# Patient Record
Sex: FEMALE | Race: BLACK OR AFRICAN AMERICAN | NOT HISPANIC OR LATINO | Employment: UNEMPLOYED | URBAN - METROPOLITAN AREA
[De-identification: names, ages, dates, MRNs, and addresses within clinical notes are randomized per-mention and may not be internally consistent; named-entity substitution may affect disease eponyms.]

---

## 2017-12-29 ENCOUNTER — OFFICE VISIT (OUTPATIENT)
Dept: INTERNAL MEDICINE | Facility: CLINIC | Age: 22
End: 2017-12-29
Payer: COMMERCIAL

## 2017-12-29 VITALS
WEIGHT: 192.38 LBS | HEIGHT: 64 IN | TEMPERATURE: 99 F | SYSTOLIC BLOOD PRESSURE: 130 MMHG | DIASTOLIC BLOOD PRESSURE: 86 MMHG | HEART RATE: 75 BPM | RESPIRATION RATE: 18 BRPM | BODY MASS INDEX: 32.84 KG/M2

## 2017-12-29 DIAGNOSIS — J45.30 MILD PERSISTENT ASTHMA WITHOUT COMPLICATION: ICD-10-CM

## 2017-12-29 DIAGNOSIS — Z72.51 UNPROTECTED SEXUAL INTERCOURSE: ICD-10-CM

## 2017-12-29 DIAGNOSIS — N89.8 VAGINAL DISCHARGE: Primary | ICD-10-CM

## 2017-12-29 DIAGNOSIS — L73.9 FOLLICULITIS: ICD-10-CM

## 2017-12-29 DIAGNOSIS — L30.8 PRURITIC DERMATITIS: ICD-10-CM

## 2017-12-29 LAB
B-HCG UR QL: NEGATIVE
CTP QC/QA: YES

## 2017-12-29 PROCEDURE — 99204 OFFICE O/P NEW MOD 45 MIN: CPT | Mod: S$GLB,,, | Performed by: FAMILY MEDICINE

## 2017-12-29 PROCEDURE — 87480 CANDIDA DNA DIR PROBE: CPT

## 2017-12-29 PROCEDURE — 87491 CHLMYD TRACH DNA AMP PROBE: CPT

## 2017-12-29 PROCEDURE — 99999 PR PBB SHADOW E&M-NEW PATIENT-LVL III: CPT | Mod: PBBFAC,,, | Performed by: FAMILY MEDICINE

## 2017-12-29 PROCEDURE — 81025 URINE PREGNANCY TEST: CPT | Mod: S$GLB,,, | Performed by: FAMILY MEDICINE

## 2017-12-29 RX ORDER — ALBUTEROL SULFATE 90 UG/1
2 AEROSOL, METERED RESPIRATORY (INHALATION) EVERY 6 HOURS PRN
Qty: 18 G | Refills: 0 | Status: SHIPPED | OUTPATIENT
Start: 2017-12-29 | End: 2018-04-16 | Stop reason: SDUPTHER

## 2017-12-29 RX ORDER — MUPIROCIN 20 MG/G
OINTMENT TOPICAL 2 TIMES DAILY
Qty: 1 TUBE | Refills: 0 | Status: SHIPPED | OUTPATIENT
Start: 2017-12-29 | End: 2018-01-08

## 2017-12-29 RX ORDER — HYDROXYZINE HYDROCHLORIDE 25 MG/1
25 TABLET, FILM COATED ORAL 3 TIMES DAILY PRN
Qty: 30 TABLET | Refills: 0 | Status: SHIPPED | OUTPATIENT
Start: 2017-12-29 | End: 2018-03-16

## 2017-12-29 RX ORDER — DEXTROAMPHETAMINE SACCHARATE, AMPHETAMINE ASPARTATE MONOHYDRATE, DEXTROAMPHETAMINE SULFATE AND AMPHETAMINE SULFATE 5; 5; 5; 5 MG/1; MG/1; MG/1; MG/1
20 CAPSULE, EXTENDED RELEASE ORAL 2 TIMES DAILY
Refills: 0 | COMMUNITY
Start: 2017-12-21 | End: 2018-03-16 | Stop reason: SDUPTHER

## 2017-12-29 RX ORDER — CLINDAMYCIN HYDROCHLORIDE 300 MG/1
300 CAPSULE ORAL 2 TIMES DAILY
Qty: 14 CAPSULE | Refills: 0 | Status: SHIPPED | OUTPATIENT
Start: 2017-12-29 | End: 2018-01-05

## 2017-12-29 RX ORDER — FLUCONAZOLE 150 MG/1
TABLET ORAL
Qty: 2 TABLET | Refills: 0 | Status: SHIPPED | OUTPATIENT
Start: 2017-12-29 | End: 2018-03-16 | Stop reason: ALTCHOICE

## 2017-12-29 RX ORDER — ALBUTEROL SULFATE 90 UG/1
2 AEROSOL, METERED RESPIRATORY (INHALATION) EVERY 6 HOURS PRN
COMMUNITY
End: 2017-12-29 | Stop reason: SDUPTHER

## 2017-12-29 RX ORDER — FLUCONAZOLE 150 MG/1
TABLET ORAL
Refills: 0 | COMMUNITY
Start: 2017-11-03 | End: 2017-12-29 | Stop reason: ALTCHOICE

## 2017-12-29 RX ORDER — SERTRALINE HYDROCHLORIDE 50 MG/1
50 TABLET, FILM COATED ORAL DAILY
Refills: 0 | COMMUNITY
Start: 2017-12-22 | End: 2018-03-16 | Stop reason: SDUPTHER

## 2017-12-29 RX ORDER — METRONIDAZOLE 500 MG/1
TABLET ORAL
Refills: 5 | COMMUNITY
Start: 2017-10-19 | End: 2017-12-29 | Stop reason: ALTCHOICE

## 2017-12-29 NOTE — PATIENT INSTRUCTIONS
Vaginal Infection: Bacterial Vaginosis  Both good and bad bacteria are present in a healthy vagina. Bacterial vaginosis (BV) occurs when these bacteria get out of balance. The numbers of good bacteria decrease. This allows the numbers of bad bacteria to increase and cause BV. In most cases, BV is not a serious problem.  Causes of bacterial vaginosis  The cause of BV is not clear. Douching may lead to it. Having sex with a new partner or more than 1 partner makes it more likely.  Symptoms of bacterial vaginosis  Symptoms of BV vary for each woman. Some women have few symptoms or none at all. If symptoms are present, they can include:  · Thin, milky white or gray or sometimes green discharge  · Unpleasant fishy odor  · Irritation, itching, and burning at opening of vagina which may indicate mixed vaginitis    · Burning or irritation with sex or urination which may indicate mixed vaginitis  Diagnosing bacterial vaginosis  Your healthcare provider will ask about your symptoms and health history. He or she will also do a pelvic exam. This is an exam of your vagina and cervix. A sample of vaginal fluid or discharge may be taken. This sample is checked for signs of BV.  Treating bacterial vaginosis  BV is often treated with antibiotics. They may be given in oral pill form or as a vaginal cream. To use these medicines:  · Be sure to take all of your medicine, even if your symptoms go away.  · If youre taking antibiotic pills, do not drink alcohol until youre finished with all of your medicine.  · If youre using vaginal cream, apply it as directed. Be aware that the cream may make condoms and diaphragms less effective.  · Call your healthcare provider if symptoms do not go away within 4 days of starting treatment. Also call if you have a reaction to the medicine.  Why treatment matters  Even if you have no symptoms or your symptoms are mild, BV should be treated. Untreated BV can lead to health problems such  as:  · Increased risk of  delivery if youre pregnant  · Increased risk of complications after surgery on the reproductive organs  · Possible increased risk of pelvic inflammatory disease (PID)   Date Last Reviewed: 3/1/2017  © 7030-4142 [employer]. [address] [place] [address]. All rights reserved. This information is not intended as a substitute for professional medical care. Always follow your healthcare professional's instructions.        Vaginal Infection: Understanding the Vaginal Environment  The vagina is a canal. It connects the uterus (womb) to the outside of the body. It is home to many types of bacteria and other tiny organisms. These different bacteria most often stay balanced in number. This keeps the vagina healthy. If the balance changes, it can cause infection.   A healthy environment  Many types of bacteria are present in a healthy vagina. When balanced, they dont cause problems. Small amounts of yeast may also be present without causing problems. The most common type of bacteria in the vagina is lactobacillus. It helps keep the vagina at a low pH. A low pH keeps bad bacteria from taking over.  Normal vaginal discharge  The vagina makes fluid. It is sent out as discharge. This also keeps the vagina healthy. Normal discharge can be clear, white, or yellowish. Most women find that normal discharge varies in amount and color through the month.  An unhealthy environment  The vaginal environment may get out of balance. This may result in a vaginal infection. There are a few reasons this can happen. The pH may have changed. The amount of one organism, such as yeast, may increase. Or an outside organism may get into the vagina and throw off the balance:  · Bacterial vaginosis (BV). BV is due to an imbalance in the normal bacteria in the vagina. Lactobacillus bacteria decrease. As a result, the numbers of bad bacteria increase.  · Candidiasis (yeast infection). Yeast is  a type of fungus. A yeast infection occurs when yeast cells in the vagina increase. They then attack vaginal tissues. A type of yeast called Candida albicans is often involved.  · Trichomoniasis (trich). Trich is a parasite. It is passed from one person to another during sex. Men with trich often dont have any symptoms. In women, it can take weeks or months before symptoms appear.  Date Last Reviewed: 3/1/2017  © 0250-8496 "[employer]. [address] [place] [address]. All rights reserved. This information is not intended as a substitute for professional medical care. Always follow your healthcare professional's instructions.        My Asthma Symptom Diary  Keep track of symptoms with the chart below. (Make some copies first.) Show your records to your healthcare provider at your visits. As your asthma control improves you should have fewer episodes of symptoms to record.     Date Symptoms Possible triggers Action taken Results Did symptoms interfere with your sleep? Yes or No?      3/3 Example:  Wheezing, peak flow 75%    Cold air    2 puffs albuterol, went inside    Symptoms gone in 20 min. No                                                                                                                           Date Last Reviewed: 10/1/2016  © 1155-9438 "[employer]. [address] [place] [id number]. All rights reserved. This information is not intended as a substitute for professional medical care. Always follow your healthcare professional's instructions.

## 2017-12-29 NOTE — PROGRESS NOTES
"Subjective:       Patient ID: Zak Wheeler is a 22 y.o. female.    Chief Complaint: heavy discharge    She is a new pt to me here with c/o having creamy odorous vaginal D/C for couple months. Seen by gyn end of October and rxd metronidazole followed by thick itchy DC and given diflucan.  Her DC came back immediately - "smelly discharge". At risk for STIs/pregnancy due to not using condoms with long term partner.    She had an early menses 3 weeks ago - about 2 weeks early. 12/11/17.       Vaginal Discharge   The patient's primary symptoms include a genital odor and vaginal discharge. The patient's pertinent negatives include no genital itching. Associated symptoms include frequency. Pertinent negatives include no dysuria or urgency. Hematuria: there is a little pink discoloration recently on tissue.     Review of Systems   Genitourinary: Positive for frequency and vaginal discharge. Negative for dysuria and urgency. Hematuria: there is a little pink discoloration recently on tissue.       Objective:      Physical Exam   Constitutional: She is oriented to person, place, and time. She appears well-developed and well-nourished.   HENT:   Head: Normocephalic and atraumatic.   Right Ear: External ear normal.   Left Ear: External ear normal.   Mouth/Throat: Oropharynx is clear and moist. No oropharyngeal exudate.   Neck: Normal range of motion. Neck supple.   Cardiovascular: Normal rate, regular rhythm and normal heart sounds.    Pulmonary/Chest: Effort normal and breath sounds normal.   Abdominal: Soft. Bowel sounds are normal. She exhibits no distension. There is no tenderness.   Genitourinary: Uterus normal. Vaginal discharge (large amount homogeneous D/C, some foamy secretions.) found.   Genitourinary Comments: No CMT. No erythema or lesions to cervix.  No adnexal TTP B and uterus NTTP.   Neurological: She is alert and oriented to person, place, and time.   Skin: Skin is warm and dry. Rash (to her left lower " quadrant there are several erythematous papules associated with hair follicles) noted.   Psychiatric: She has a normal mood and affect. Her behavior is normal.         Assessment/Plan:     1. Vaginal discharge  Vaginosis Screen by DNA Probe    C. trachomatis/N. gonorrhoeae by AMP DNA Urine    clindamycin (CLEOCIN) 300 MG capsule    fluconazole (DIFLUCAN) 150 MG Tab   2. Mild persistent asthma without complication  albuterol 90 mcg/actuation inhaler   3. Unprotected sexual intercourse  POCT urine pregnancy   4. Folliculitis  mupirocin (BACTROBAN) 2 % ointment   5. Pruritic dermatitis  hydrOXYzine HCl (ATARAX) 25 MG tablet    she appears to be using her rescue inhaler at a level requiring maintenance meds.  She will note down the circumstances of each use of her inhaler and bring them to the next visit in 4 weeks.  We'll treat empirically for BV with an alternate medication as well as recommend using Diflucan now and again at the end of her antibiotic course.  She can use mupirocin to the follicular lesions.

## 2017-12-30 LAB
C TRACH DNA SPEC QL NAA+PROBE: DETECTED
CANDIDA RRNA VAG QL PROBE: NEGATIVE
G VAGINALIS RRNA GENITAL QL PROBE: POSITIVE
N GONORRHOEA DNA SPEC QL NAA+PROBE: NOT DETECTED
T VAGINALIS RRNA GENITAL QL PROBE: NEGATIVE

## 2018-01-03 ENCOUNTER — TELEPHONE (OUTPATIENT)
Dept: INTERNAL MEDICINE | Facility: CLINIC | Age: 23
End: 2018-01-03

## 2018-01-03 DIAGNOSIS — A74.9 CHLAMYDIA INFECTION: Primary | ICD-10-CM

## 2018-01-03 RX ORDER — AZITHROMYCIN 250 MG/1
1000 TABLET, FILM COATED ORAL ONCE
Qty: 8 TABLET | Refills: 0 | Status: SHIPPED | OUTPATIENT
Start: 2018-01-03 | End: 2018-01-03

## 2018-01-04 NOTE — TELEPHONE ENCOUNTER
I gave the patient her results and recommendations.  A partner dose was sent to her pharmacy along with her dose.  This was explained to the patient that she will take 4 tablets and partner will take 4 tablets that it is all done as 1 dose.  We will recheck in 3 months to confirm that she remains uninfected.

## 2018-01-08 ENCOUNTER — TELEPHONE (OUTPATIENT)
Dept: INTERNAL MEDICINE | Facility: CLINIC | Age: 23
End: 2018-01-08

## 2018-01-08 DIAGNOSIS — A74.9 CHLAMYDIA INFECTION: Primary | ICD-10-CM

## 2018-01-08 RX ORDER — DOXYCYCLINE 100 MG/1
100 CAPSULE ORAL EVERY 12 HOURS
Qty: 14 CAPSULE | Refills: 0 | Status: SHIPPED | OUTPATIENT
Start: 2018-01-08 | End: 2018-01-15

## 2018-01-08 NOTE — TELEPHONE ENCOUNTER
Recommend she take a doxycycline course.  I will send that to her pharmacy.  Must take for full 7 days a.m. and p.m.

## 2018-01-08 NOTE — TELEPHONE ENCOUNTER
Pt states that she was prescribed azithromycin (4 pills) and she took them and threw up after. She wants to know if she needs to retake or be prescribed something different that she can tolerate.    Please Advise

## 2018-01-08 NOTE — TELEPHONE ENCOUNTER
----- Message from Julieta Mccabe sent at 1/8/2018  2:56 PM CST -----  Contact: Pt.  Returning call to nurse.   Call pt at [phone].

## 2018-01-08 NOTE — TELEPHONE ENCOUNTER
Returned the patient phone call. Advised that another medication was called to her pharmacy and gave information as to how she is to take the medication. Pt verbalized understanding of the information given.

## 2018-03-16 ENCOUNTER — OFFICE VISIT (OUTPATIENT)
Dept: INTERNAL MEDICINE | Facility: CLINIC | Age: 23
End: 2018-03-16
Payer: COMMERCIAL

## 2018-03-16 VITALS
BODY MASS INDEX: 33.47 KG/M2 | TEMPERATURE: 96 F | WEIGHT: 195 LBS | OXYGEN SATURATION: 98 % | HEART RATE: 79 BPM | SYSTOLIC BLOOD PRESSURE: 126 MMHG | DIASTOLIC BLOOD PRESSURE: 47 MMHG

## 2018-03-16 DIAGNOSIS — B96.89 BV (BACTERIAL VAGINOSIS): ICD-10-CM

## 2018-03-16 DIAGNOSIS — A74.9 CHLAMYDIA INFECTION: ICD-10-CM

## 2018-03-16 DIAGNOSIS — R41.840 INATTENTION: ICD-10-CM

## 2018-03-16 DIAGNOSIS — N76.0 BV (BACTERIAL VAGINOSIS): ICD-10-CM

## 2018-03-16 DIAGNOSIS — Z86.19 HISTORY OF SEXUALLY TRANSMITTED DISEASE: ICD-10-CM

## 2018-03-16 DIAGNOSIS — F41.9 ANXIETY: Primary | ICD-10-CM

## 2018-03-16 PROCEDURE — 99999 PR PBB SHADOW E&M-EST. PATIENT-LVL III: CPT | Mod: PBBFAC,,, | Performed by: FAMILY MEDICINE

## 2018-03-16 PROCEDURE — 99214 OFFICE O/P EST MOD 30 MIN: CPT | Mod: S$GLB,,, | Performed by: FAMILY MEDICINE

## 2018-03-16 PROCEDURE — 87491 CHLMYD TRACH DNA AMP PROBE: CPT

## 2018-03-16 RX ORDER — METRONIDAZOLE 500 MG/1
500 TABLET ORAL 2 TIMES DAILY
Qty: 14 TABLET | Refills: 0 | Status: SHIPPED | OUTPATIENT
Start: 2018-03-16 | End: 2018-03-26

## 2018-03-16 RX ORDER — DEXTROAMPHETAMINE SACCHARATE, AMPHETAMINE ASPARTATE MONOHYDRATE, DEXTROAMPHETAMINE SULFATE AND AMPHETAMINE SULFATE 5; 5; 5; 5 MG/1; MG/1; MG/1; MG/1
20 CAPSULE, EXTENDED RELEASE ORAL 2 TIMES DAILY
Qty: 60 CAPSULE | Refills: 0 | Status: SHIPPED | OUTPATIENT
Start: 2018-03-16 | End: 2020-09-01

## 2018-03-16 RX ORDER — SERTRALINE HYDROCHLORIDE 50 MG/1
75 TABLET, FILM COATED ORAL DAILY
Qty: 45 TABLET | Refills: 0 | Status: SHIPPED | OUTPATIENT
Start: 2018-03-16 | End: 2019-02-05 | Stop reason: SDUPTHER

## 2018-03-16 NOTE — PROGRESS NOTES
Subjective:       Patient ID: Zak Wheeler is a 23 y.o. female.    Chief Complaint: Follow-up (STD); Rx refill; and Asthma Questions (congestion)    She is here for a recheck regarding possible reinfection of her Chlamydia after treatment in December.  Has had spotting x 4 days. Lighter and shorter than usual menses. Also c/o itching to skin in area.   Last menses 2/28 - 3/4/18. Normal menses.  She is also complaining of congestion and is requesting refills on Adderall.  I have not prescribed Adderall for her in the past.   Review of the statewide database shows she has received Adderall XR 20 twice a day from Dr. Roland Darling in [place] - check of state database shows monthly fills from February March May and November December 2017 January 2018.  The initial dose was for the Adderall XR 10 mg 1 daily in February and then it went to twice a day 20 mg XR.  She has also been on sertraline 50 mg.  She sts she was started on lexapro and switched to sertraline which she likes better. she is wanting to get refills from a closer doctor. She feels less motivated, wants to sleep all the time for last a month.  On sertraline, she states still has some worry, indecision but it is manageable.     She sts she was seen in 10th grade for panic disorder - dxd with anxiety. Then last year or so they decided to put her on adderall. She does not know what her dx is. C student throughout school. Has some problem with appts, forgets where things are. She takes the 20 XR in AM and at noon.    She has been getting dizzyness if she blows her nose a lot last week. Can't breathe through nose and can't sleep. She uses nasal saline drops and was told to take PSE x 3 days by [facility]. Uses claritin but it stopped working. Has used zyrtec.      Review of Systems   HENT: Positive for congestion, rhinorrhea and sneezing. Negative for ear pain.    Respiratory: Positive for chest tightness (occas - uses albuterol 1-2 times weekly). Negative  for cough and shortness of breath.    Cardiovascular: Negative for chest pain and palpitations.   Genitourinary: Positive for frequency and vaginal discharge. Negative for dysuria and urgency.       Objective:      Physical Exam   Constitutional: She is oriented to person, place, and time. She appears well-developed and well-nourished.   HENT:   Head: Normocephalic and atraumatic.   Right Ear: Tympanic membrane, external ear and ear canal normal.   Left Ear: Tympanic membrane, external ear and ear canal normal.   Nose: Nose normal.   Mouth/Throat: Oropharynx is clear and moist. No oropharyngeal exudate.   Eyes: Conjunctivae and EOM are normal.   Neck: Neck supple. No thyromegaly present.   Cardiovascular: Normal rate, regular rhythm and normal heart sounds.    Pulmonary/Chest: Effort normal and breath sounds normal.   Lymphadenopathy:     She has no cervical adenopathy.   Neurological: She is alert and oriented to person, place, and time.   Skin: Skin is warm and dry.   Psychiatric: She has a normal mood and affect. Her behavior is normal.         Assessment/Plan:     1. Anxiety  sertraline (ZOLOFT) 50 MG tablet   2. Inattention  dextroamphetamine-amphetamine (ADDERALL XR) 20 MG 24 hr capsule   3. BV (bacterial vaginosis)  metroNIDAZOLE (FLAGYL) 500 MG tablet   4. History of sexually transmitted disease     5. Chlamydia infection  C. trachomatis/N. gonorrhoeae by AMP DNA Urine   treat empirically for BV, check for chlamydia.  I will go ahead and refill her adderall xr 20 BID - and ask for records from Dr Cooper. Also consider increase in sertraline as her problems with isolation and fatigue were occurring while on the adderall at start of semester - she had to drop two classes out of 6 this semester.  She would like to go to 75 mg sertraline, not 100. She just got a 30 day refill for the 50 mg dose. Therefore she will take 1 1/2 starting now with current #30 and then continue with new Rx. Recheck 4-6 weeks.

## 2018-03-17 LAB
C TRACH DNA SPEC QL NAA+PROBE: NOT DETECTED
N GONORRHOEA DNA SPEC QL NAA+PROBE: NOT DETECTED

## 2018-03-19 ENCOUNTER — TELEPHONE (OUTPATIENT)
Dept: INTERNAL MEDICINE | Facility: CLINIC | Age: 23
End: 2018-03-19

## 2018-03-19 NOTE — TELEPHONE ENCOUNTER
Returned the pt phone call. Gave her test results. Pt verbalized understanding of the information given.

## 2018-03-19 NOTE — TELEPHONE ENCOUNTER
----- Message from Myra Ny sent at 3/19/2018  2:55 PM CDT -----  Contact: Pt  Call pt at [phone] (home)   Pt was returning a missed call

## 2018-04-16 DIAGNOSIS — J45.30 MILD PERSISTENT ASTHMA WITHOUT COMPLICATION: ICD-10-CM

## 2018-04-16 RX ORDER — ALBUTEROL SULFATE 90 UG/1
AEROSOL, METERED RESPIRATORY (INHALATION)
Qty: 18 G | Refills: 0 | Status: SHIPPED | OUTPATIENT
Start: 2018-04-16 | End: 2020-09-01

## 2019-02-05 DIAGNOSIS — F41.9 ANXIETY: ICD-10-CM

## 2019-02-05 RX ORDER — SERTRALINE HYDROCHLORIDE 50 MG/1
TABLET, FILM COATED ORAL
Qty: 45 TABLET | Refills: 3 | Status: SHIPPED | OUTPATIENT
Start: 2019-02-05 | End: 2020-09-01

## 2020-07-01 ENCOUNTER — OFFICE VISIT (OUTPATIENT)
Dept: OBSTETRICS AND GYNECOLOGY | Facility: CLINIC | Age: 25
End: 2020-07-01
Payer: COMMERCIAL

## 2020-07-01 ENCOUNTER — TELEPHONE (OUTPATIENT)
Dept: OBSTETRICS AND GYNECOLOGY | Facility: CLINIC | Age: 25
End: 2020-07-01

## 2020-07-01 ENCOUNTER — LAB VISIT (OUTPATIENT)
Dept: LAB | Facility: HOSPITAL | Age: 25
End: 2020-07-01
Attending: OBSTETRICS & GYNECOLOGY
Payer: COMMERCIAL

## 2020-07-01 VITALS
SYSTOLIC BLOOD PRESSURE: 100 MMHG | HEIGHT: 64 IN | WEIGHT: 208.75 LBS | DIASTOLIC BLOOD PRESSURE: 76 MMHG | BODY MASS INDEX: 35.64 KG/M2

## 2020-07-01 DIAGNOSIS — Z12.4 PAPANICOLAOU SMEAR FOR CERVICAL CANCER SCREENING: ICD-10-CM

## 2020-07-01 DIAGNOSIS — Z01.419 ROUTINE GYNECOLOGICAL EXAMINATION: Primary | ICD-10-CM

## 2020-07-01 DIAGNOSIS — Z11.3 SCREEN FOR STD (SEXUALLY TRANSMITTED DISEASE): ICD-10-CM

## 2020-07-01 DIAGNOSIS — N89.8 VAGINAL DISCHARGE: ICD-10-CM

## 2020-07-01 PROCEDURE — 87481 CANDIDA DNA AMP PROBE: CPT | Mod: 59

## 2020-07-01 PROCEDURE — 88141 CYTOPATH C/V INTERPRET: CPT | Mod: ,,, | Performed by: PATHOLOGY

## 2020-07-01 PROCEDURE — 87661 TRICHOMONAS VAGINALIS AMPLIF: CPT

## 2020-07-01 PROCEDURE — 99385 PREV VISIT NEW AGE 18-39: CPT | Mod: S$GLB,,, | Performed by: NURSE PRACTITIONER

## 2020-07-01 PROCEDURE — 88175 CYTOPATH C/V AUTO FLUID REDO: CPT | Performed by: PATHOLOGY

## 2020-07-01 PROCEDURE — 87801 DETECT AGNT MULT DNA AMPLI: CPT

## 2020-07-01 PROCEDURE — 99385 PR PREVENTIVE VISIT,NEW,18-39: ICD-10-PCS | Mod: S$GLB,,, | Performed by: NURSE PRACTITIONER

## 2020-07-01 PROCEDURE — 99999 PR PBB SHADOW E&M-EST. PATIENT-LVL III: ICD-10-PCS | Mod: PBBFAC,,, | Performed by: NURSE PRACTITIONER

## 2020-07-01 PROCEDURE — 36415 COLL VENOUS BLD VENIPUNCTURE: CPT

## 2020-07-01 PROCEDURE — 86592 SYPHILIS TEST NON-TREP QUAL: CPT

## 2020-07-01 PROCEDURE — 87491 CHLMYD TRACH DNA AMP PROBE: CPT | Mod: 59

## 2020-07-01 PROCEDURE — 99999 PR PBB SHADOW E&M-EST. PATIENT-LVL III: CPT | Mod: PBBFAC,,, | Performed by: NURSE PRACTITIONER

## 2020-07-01 PROCEDURE — 86703 HIV-1/HIV-2 1 RESULT ANTBDY: CPT

## 2020-07-01 PROCEDURE — 88141 PR  CYTOPATH CERV/VAG INTERPRET: ICD-10-PCS | Mod: ,,, | Performed by: PATHOLOGY

## 2020-07-01 RX ORDER — IPRATROPIUM BROMIDE 0.5 MG/2.5ML
SOLUTION RESPIRATORY (INHALATION)
COMMUNITY
Start: 2020-06-19 | End: 2020-09-01

## 2020-07-01 RX ORDER — FLUTICASONE PROPIONATE AND SALMETEROL 50; 250 UG/1; UG/1
POWDER RESPIRATORY (INHALATION)
COMMUNITY
Start: 2020-06-18 | End: 2020-09-01

## 2020-07-01 RX ORDER — MONTELUKAST SODIUM 10 MG/1
TABLET ORAL
COMMUNITY
End: 2020-09-01

## 2020-07-01 RX ORDER — CYCLOBENZAPRINE HCL 10 MG
TABLET ORAL
COMMUNITY
Start: 2020-06-18 | End: 2020-09-01

## 2020-07-01 NOTE — TELEPHONE ENCOUNTER
Patient called with vaginal discomfort.  Appointment scheduled.  Visitor's policy and check in procedure explained and patient verbalized understanding.

## 2020-07-01 NOTE — PROGRESS NOTES
"CC: Well woman exam    HPI  Zak Wheeler is a 25 y.o. female No obstetric history on file. presents for a well woman exam.  LMP: Patient's last menstrual period was 06/12/2020..  Desires STD screening and is c/o vaginal discharge and odor denies itching, states that she has tried to treat ir with increasing water intake and to "wait and see" with poor results     Past Medical History:   Diagnosis Date    ADHD (attention deficit hyperactivity disorder)     Anxiety     Asthma     IBS (irritable colon syndrome)      Past Surgical History:   Procedure Laterality Date    TYMPANOSTOMY TUBE PLACEMENT Bilateral     age of 6 or 7      Social History     Socioeconomic History    Marital status: Single     Spouse name: Not on file    Number of children: 0    Years of education: Not on file    Highest education level: Not on file   Occupational History    Occupation: student - social work     Comment: [employer]   Social Needs    Financial resource strain: Not on file    Food insecurity     Worry: Not on file     Inability: Not on file    Transportation needs     Medical: Not on file     Non-medical: Not on file   Tobacco Use    Smoking status: Never Smoker    Smokeless tobacco: Never Used   Substance and Sexual Activity    Alcohol use: Yes     Comment: socially     Drug use: No    Sexual activity: Yes     Partners: Male   Lifestyle    Physical activity     Days per week: Not on file     Minutes per session: Not on file    Stress: Not on file   Relationships    Social connections     Talks on phone: Not on file     Gets together: Not on file     Attends Confucianist service: Not on file     Active member of club or organization: Not on file     Attends meetings of clubs or organizations: Not on file     Relationship status: Not on file   Other Topics Concern    Not on file   Social History Narrative    Not on file     Family History   Problem Relation Age of Onset    Thyroid disease Mother     No Known " "Problems Father      OB History    No obstetric history on file.         Current Outpatient Medications:     ADVAIR DISKUS 250-50 mcg/dose diskus inhaler, , Disp: , Rfl:     cyclobenzaprine (FLEXERIL) 10 MG tablet, , Disp: , Rfl:     dextroamphetamine-amphetamine (ADDERALL XR) 20 MG 24 hr capsule, Take 1 capsule (20 mg total) by mouth 2 (two) times daily., Disp: 60 capsule, Rfl: 0    ipratropium (ATROVENT) 0.02 % nebulizer solution, INHALE 1 VIAL BY NEBULIZER QID, Disp: , Rfl:     montelukast (SINGULAIR) 10 mg tablet, montelukast 10 mg tablet, Disp: , Rfl:     sertraline (ZOLOFT) 50 MG tablet, TAKE 1 AND 1/2 TABLETS(75 MG) BY MOUTH EVERY DAY, Disp: 45 tablet, Rfl: 3    VENTOLIN HFA 90 mcg/actuation inhaler, INHALE 2 PUFFS INTO THE LUNGS EVERY 6 HOURS AS NEEDED FOR WHEEZING FOR RESCUE, Disp: 18 g, Rfl: 0    GYNECOLOGY HISTORY:  Desires std screening    DATA REVIEWED:  Last pap: 2017 Normal pap repeat pap due today     /76   Ht 5' 4" (1.626 m)   Wt 94.7 kg (208 lb 12.4 oz)   LMP 06/12/2020   BMI 35.84 kg/m²     Review of Systems   Constitutional: Negative.    HENT: Negative.    Eyes: Negative.    Respiratory: Negative.    Cardiovascular: Negative.    Gastrointestinal: Negative.    Endocrine: Negative.    Genitourinary: Positive for vaginal discharge.   Musculoskeletal: Negative.    Skin: Negative.    Allergic/Immunologic: Negative.    Neurological: Negative.    Hematological: Negative.    Psychiatric/Behavioral: Negative.        Physical Exam  Constitutional:       Appearance: Normal appearance.   HENT:      Head: Normocephalic.      Nose: Nose normal.      Mouth/Throat:      Mouth: Mucous membranes are moist.   Eyes:      Extraocular Movements: Extraocular movements intact.   Neck:      Musculoskeletal: Normal range of motion.   Pulmonary:      Effort: Pulmonary effort is normal.   Abdominal:      General: Abdomen is flat.      Palpations: Abdomen is soft.   Genitourinary:     General: Normal vulva. "      Exam position: Supine.      Rectum: Normal.   Musculoskeletal: Normal range of motion.   Skin:     General: Skin is warm and dry.   Neurological:      Mental Status: She is alert and oriented to person, place, and time.   Psychiatric:         Mood and Affect: Mood normal.         Behavior: Behavior normal.         Thought Content: Thought content normal.         Judgment: Judgment normal.         Routine gynecological examination    Screen for STD (sexually transmitted disease)  -     C. trachomatis/N. gonorrhoeae by AMP DNA [facility]; Cervix  -     HIV 1/2 Ag/Ab (4th Gen); Future; Expected date: 07/01/2020  -     RPR; Future; Expected date: 07/01/2020    Papanicolaou smear for cervical cancer screening  -     Liquid-Based Pap Smear, Screening    Vaginal discharge  -     Vaginosis Screen by DNA Probe        Patient was counseled today on A.C.S. Pap guidelines (Q3) and recommendations for yearly pelvic exams, yearly mammograms starting age 40, and clinical breast exams; to see her PCP for other health maintenance.

## 2020-07-02 LAB
C TRACH DNA SPEC QL NAA+PROBE: NOT DETECTED
HIV 1+2 AB+HIV1 P24 AG SERPL QL IA: NEGATIVE
N GONORRHOEA DNA SPEC QL NAA+PROBE: NOT DETECTED
RPR SER QL: NORMAL

## 2020-07-07 ENCOUNTER — TELEPHONE (OUTPATIENT)
Dept: OBSTETRICS AND GYNECOLOGY | Facility: CLINIC | Age: 25
End: 2020-07-07

## 2020-07-07 DIAGNOSIS — N89.8 VAGINAL ITCHING: Primary | ICD-10-CM

## 2020-07-07 DIAGNOSIS — N89.8 VAGINAL ODOR: Primary | ICD-10-CM

## 2020-07-07 RX ORDER — METRONIDAZOLE 500 MG/1
500 TABLET ORAL EVERY 12 HOURS
Qty: 14 TABLET | Refills: 0 | Status: SHIPPED | OUTPATIENT
Start: 2020-07-07 | End: 2020-07-14

## 2020-07-07 RX ORDER — FLUCONAZOLE 100 MG/1
150 TABLET ORAL ONCE
Qty: 1 TABLET | Refills: 0 | Status: SHIPPED | OUTPATIENT
Start: 2020-07-07 | End: 2020-07-07

## 2020-07-07 NOTE — TELEPHONE ENCOUNTER
Spoke with patient notified of new prescription sent to pharmacy. Pt states she will need diflucan sent to pharmacy as well. When she takes metronidazole she usually has yeast after.

## 2020-07-07 NOTE — TELEPHONE ENCOUNTER
Pt called in regards to lab results. Notified of RPR,HIV, Gonorrhea and chlamydia results. Vaginosis and pap smear in process.     Pt states she is having smelly discharge and itching and is requesting to have a medication sent in for treatment. Please advise.

## 2020-07-10 LAB
FINAL PATHOLOGIC DIAGNOSIS: ABNORMAL
Lab: ABNORMAL

## 2020-07-14 DIAGNOSIS — B37.9 CANDIDA INFECTION: Primary | ICD-10-CM

## 2020-07-14 LAB
BACTERIAL VAGINOSIS DNA: POSITIVE
CANDIDA GLABRATA DNA: NEGATIVE
CANDIDA KRUSEI DNA: NEGATIVE
CANDIDA RRNA VAG QL PROBE: POSITIVE
T VAGINALIS RRNA GENITAL QL PROBE: NEGATIVE

## 2020-07-14 RX ORDER — FLUCONAZOLE 100 MG/1
150 TABLET ORAL DAILY
Qty: 2 TABLET | Refills: 0 | Status: SHIPPED | OUTPATIENT
Start: 2020-07-14 | End: 2020-07-15

## 2020-07-17 ENCOUNTER — TELEPHONE (OUTPATIENT)
Dept: OBSTETRICS AND GYNECOLOGY | Facility: CLINIC | Age: 25
End: 2020-07-17

## 2020-07-17 NOTE — TELEPHONE ENCOUNTER
Spoke to client   2 client identifiers verified   States that she only has discharge and the odor has gone away. No longer needs prescription.

## 2020-07-17 NOTE — TELEPHONE ENCOUNTER
Pt is requesting if another week of flagyl can be called in. States that the discharge has not improved.

## 2020-09-01 ENCOUNTER — OFFICE VISIT (OUTPATIENT)
Dept: OBSTETRICS AND GYNECOLOGY | Facility: CLINIC | Age: 25
End: 2020-09-01
Payer: COMMERCIAL

## 2020-09-01 VITALS
BODY MASS INDEX: 34.74 KG/M2 | WEIGHT: 203.5 LBS | HEIGHT: 64 IN | DIASTOLIC BLOOD PRESSURE: 60 MMHG | SYSTOLIC BLOOD PRESSURE: 104 MMHG

## 2020-09-01 DIAGNOSIS — R39.15 URGENCY OF URINATION: Primary | ICD-10-CM

## 2020-09-01 DIAGNOSIS — N89.8 VAGINAL DISCHARGE: ICD-10-CM

## 2020-09-01 PROCEDURE — 87210 PR  SMEAR,STAIN,WET MNT,INTERP: ICD-10-PCS | Mod: QW,S$GLB,, | Performed by: NURSE PRACTITIONER

## 2020-09-01 PROCEDURE — 87088 URINE BACTERIA CULTURE: CPT

## 2020-09-01 PROCEDURE — 99999 PR PBB SHADOW E&M-EST. PATIENT-LVL III: ICD-10-PCS | Mod: PBBFAC,,, | Performed by: NURSE PRACTITIONER

## 2020-09-01 PROCEDURE — 99999 PR PBB SHADOW E&M-EST. PATIENT-LVL III: CPT | Mod: PBBFAC,,, | Performed by: NURSE PRACTITIONER

## 2020-09-01 PROCEDURE — 81002 URINALYSIS NONAUTO W/O SCOPE: CPT | Mod: S$GLB,,, | Performed by: NURSE PRACTITIONER

## 2020-09-01 PROCEDURE — 87086 URINE CULTURE/COLONY COUNT: CPT

## 2020-09-01 PROCEDURE — 81025 URINE PREGNANCY TEST: CPT | Mod: S$GLB,,, | Performed by: NURSE PRACTITIONER

## 2020-09-01 PROCEDURE — 81025 PR  URINE PREGNANCY TEST: ICD-10-PCS | Mod: S$GLB,,, | Performed by: NURSE PRACTITIONER

## 2020-09-01 PROCEDURE — 99213 OFFICE O/P EST LOW 20 MIN: CPT | Mod: 25,S$GLB,, | Performed by: NURSE PRACTITIONER

## 2020-09-01 PROCEDURE — 87210 SMEAR WET MOUNT SALINE/INK: CPT | Mod: QW,S$GLB,, | Performed by: NURSE PRACTITIONER

## 2020-09-01 PROCEDURE — 81002 PR URINALYSIS NONAUTO W/O SCOPE: ICD-10-PCS | Mod: S$GLB,,, | Performed by: NURSE PRACTITIONER

## 2020-09-01 PROCEDURE — 99213 PR OFFICE/OUTPT VISIT, EST, LEVL III, 20-29 MIN: ICD-10-PCS | Mod: 25,S$GLB,, | Performed by: NURSE PRACTITIONER

## 2020-09-01 RX ORDER — DEXTROAMPHETAMINE SACCHARATE, AMPHETAMINE ASPARTATE, DEXTROAMPHETAMINE SULFATE AND AMPHETAMINE SULFATE 7.5; 7.5; 7.5; 7.5 MG/1; MG/1; MG/1; MG/1
30 TABLET ORAL 2 TIMES DAILY
COMMUNITY

## 2020-09-01 NOTE — PATIENT INSTRUCTIONS
Bacterial Vaginosis    You have a vaginal infection called bacterial vaginosis (BV). Both good and bad bacteria are present in a healthy vagina. BV occurs when these bacteria get out of balance. The number of bad bacteria increase. And the number of good bacteria decrease.  BV may or may not cause symptoms. If symptoms do occur, they can include:  · Thin, gray, milky-white, or sometimes green discharge  · Unpleasant odor or fishy smell  · Itching, burning, or pain in or around the vagina  It is not known what causes BV, but certain factors can make the problem more likely. This can include:  · Douching  · Having sex with a new partner  · Having sex with more than one partner  BV will sometimes go away on its own. But treatment is usually recommended. This is because untreated BV can increase the risk of more serious health problems such as:  · Pelvic inflammatory disease (PID)  ·  delivery (giving birth to a baby early if youre pregnant)  · HIV and certain other sexually transmitted diseases (STDs)  · Infection after surgery on the reproductive organs  Home care  General care  · BV is most often treated with medicines called antibiotics. These may be given as pills or as a vaginal cream. If antibiotics are prescribed, be sure to use them exactly as directed. Also, be sure to complete all of the medicine, even if your symptoms go away.  · Avoid douching or having sex during treatment.  · If you have sex with a female partner, ask your healthcare provider if she should also be treated.  Prevention  · Limit or avoid douching.  · Avoid having sex. If you do have sex, then take steps to lower your risk:  ¨ Use condoms when having sex.  ¨ Limit the number of partners you have sex with.  Follow-up care  Follow up with your healthcare provider, or as advised.  When to seek medical advice  Call your healthcare provider right away if:  · You have a fever of 100.4ºF (38ºC) or higher, or as directed by your  provider.  · Your symptoms worsen, or they dont go away within a few days of starting treatment.  · You have new pain in the lower belly or pelvic region.  · You have side effects that bother you or a reaction to the pills or cream youre prescribed.  · You or any partners you have sex with have new symptoms, such as a rash, joint pain, or sores.  Date Last Reviewed: 7/30/2015  © 7648-4464 [employer]. [address] [place] [address], [place] [address]. All rights reserved. This information is not intended as a substitute for professional medical care. Always follow your healthcare professional's instructions.          Colposcopy  Colposcopy is a procedure that gives your health care provider a magnified view of the cervix. It is done using a lighted microscope called a colposcope. In most cases, a sample of cervical cells is taken during a biopsy. The sample can then be studied in a lab. If any problems are found, you and your health care provider will discuss treatment options. It usually takes less than 30 minutes, and you can often go back to your normal routine right away.  Reasons for the procedure  Colposcopy is usually done as a follow-up exam to help find the cause of an abnormal Pap test. Results of an abnormal Pap test can mean that the cells do not appear normal or that there are cancerous cells. Abnormal cells can also be caused by infections. HPV (human papillomavirus) is a large family of viruses that can be passed from person to person through sex. HPV can cause genital warts. It can also cause changes in cervical cells. If an HPV test is positive and the Pap test is abnormal, a colposcopy may be recommended. Colposcopy is also used to evaluate other problems. These include pain or bleeding during sexual intercourse, or a lesion on the vulva or vagina.  What are the risks?  Problems after colposcopy are very rare, but can include:  · Bleeding (if a biopsy is done)  · Infection  Getting ready  for the procedure  Colposcopy is normally done in your health care providers office. It will be scheduled for a time when youre not having your menstrual period. You may be asked to sign a form giving your consent to have the procedure. A day or 2 before the procedure, your health care provider may also ask you to:  · Avoid sexual intercourse.  · Stop using tampons.  · Avoid using creams or other vaginal medicines.  · Avoid douching.  · Take over-the-counter pain medicines an hour or 2 before the procedure.  During colposcopy  · You will be asked to lie on an exam table with your knees bent, just as you do for a Pap test.  · An instrument called a speculum is inserted into the vagina to hold it open.  · A vinegar solution is applied to the cervix to make the abnormal cells easier to see. You may feel pressure or a slight burning for a few moments. In some cases, the cervix may be numbed first with an anesthetic.  · The cervix is viewed through the colposcope, which is placed outside the vagina.  · If your health care provider sees abnormal areas on the cervix, a biopsy will be done. The tissue sample is sent to a lab for study.  · An endocervical curettage may also be done at the time of colposcopy. In this procedure, an instrument is put into the endocervical canal to get a sample of cells from the endocervix. This area can't be seen with a colposcope.   · You may feel slight pinching or cramping during the biopsy. Medicine may be applied to the biopsy site to stop bleeding.  After the procedure  · If you feel lightheaded or dizzy, you can rest on the table until youre ready to get dressed.  · If a biopsy was done, you may have mild cramping or light bleeding for a few days. You may also have discharge from the medicine used to stop bleeding at the biopsy site.  · Use pads, not tampons, for at least the first 24 hours.  · If you have any discomfort, over-the-counter pain medicine can provide relief.  · Ask your  health care provider when you can resume sexual intercourse.  Follow-up  If a biopsy was done, your health care provider will get the lab report in a week or 2. You and your health care provider can then discuss the results. In some cases, you may be scheduled for further tests or treatment. Be sure to keep follow-up appointments with your health care provider.     Call your health care provider if you have:  · Heavy vaginal bleeding (more than a pad an hour for 2 hours).  · Severe or increasing pelvic pain.  · A fever over 100.4°F (38°C)  · Foul-smelling or unusual vaginal discharge.   Date Last Reviewed: 5/10/2015  © 1513-7768 [employer]. [address] [place] [address], [place] [address]. All rights reserved. This information is not intended as a substitute for professional medical care. Always follow your healthcare professional's instructions.        Understanding Urinary Tract Infections (UTIs)  Most UTIs are caused by bacteria, although they may also be caused by viruses or fungi. Bacteria from the bowel are the most common source of infection. The infection may start because of any of the following:  · Sexual activity. During sex, bacteria can travel from the penis, vagina, or rectum into the urethra.   · Bacteria on the skin outside the rectum may travel into the urethra. This is more common in women since the rectum and urethra are closer to each other than in men. Wiping from front to back after using the toilet and keeping the area clean can help prevent germs from getting to the urethra.  · Blockage of urine flow through the urinary tract. If urine sits too long, germs may start to grow out of control.      Parts of the urinary tract  The infection can occur in any part of the urinary tract.  · The kidneys collect and store urine.  · The ureters carry urine from the kidneys to the bladder.  · The bladder holds urine until you are ready to let it out.  · The urethra carries urine from the bladder  out of the body. It is shorter in women, so bacteria can move through it more easily. The urethra is longer in men, so a UTI is less likely to reach the bladder or kidneys in men.  Date Last Reviewed: 1/1/2017 © 2000-2017 "[employer]. [address] [place] [address]. All rights reserved. This information is not intended as a substitute for professional medical care. Always follow your healthcare professional's instructions.        Human Papillomavirus (HPV)  Does this test have other names?  HPV DNA test, DNA Pap, HPV co-test  What is this test?  This test checks for the human papillomavirus (HPV) around the cervix. HPVs can cause warts, including plantar warts on the bottom of the feet and genital warts. They can also cause different kinds of cancers, including cervical, throat, and anal cancers.  More than 100 types of HPVs have been identified. Relatively few carry a high cancer risk. HPV can travel from person to person during sexual contact. In fact, it's one of the most widely spread sexually transmitted diseases (STDs).    Why do I need this test?  You may need this test to see whether you have HPV. Because long-term infection with HPV is the greatest risk factor for cervical cancer, this test is commonly used to check women for viruses that could cause this cancer. The test may be done at the same time as a Pap test, which checks for abnormal cervical cells or cervical cancer.  The American Cancer Society (ACS) suggests that women ages 30 and older have a Pap test every 5 years along with an HPV test. Another reasonable choice for women ages 30 to 65 is to get tested every 3 years with just the Pap test.  The HPV test is not recommended as a cervical cancer screening test for sexually active women in their 20s. These women are much more likely to have an HPV infection that will go away on its own, so the results of an HPV test are less likely to be useful. But the HPV test might be  done if a woman in her 20s has an abnormal Pap test.  Although HPV also causes anal cancer and healthcare providers can check for signs of abnormal cells in the anus, testing for cancer-causing HPV in the anus is not currently common.   What other tests might I have along with this test?  Your healthcare provider may do a Pap test. This involves collecting a sample to check for abnormal cells. If needed, your provider may also check for gonorrhea and chlamydia, two other STDs.  What do my test results mean?  Many things may affect your lab test results. These include the method each lab uses to do the test. Even if your test results are different from the normal value, you may not have a problem. To learn what the results mean for you, talk with your healthcare provider.  Tests for cervical HPV check for DNA from several types of HPV. Typically, the test will report whether it found types of HPV that could cause cancer. A negative result means that the test didn't find HPV types that could cause cancer. Or it found only types that carry a low risk for cancer. A positive result means the test found at least one HPV type that could cause cancer. It doesn't mean that you have cancer, although it may mean you need other tests.    How is this test done?  This test requires a sample of cells from your cervix. To collect the sample, your healthcare provider will put a speculum into your vagina so that he or she can reach the cervix. Your provider will use one or more devices--shaped like a spatula, brush, or both--to collect samples of cells in the cervix.  Does this test pose any risks?  This test poses no known risks.  What might affect my test results?  The results don't seem to be affected by menstrual blood or lubricant in the vagina. Little is known about the effect of vaginal intercourse, tampons, and douching shortly before test.   How do I get ready for this test?  Ask your healthcare provider or nurse if you need  to do anything to prepare for this test. The ACS recommends avoiding intercourse, douches, tampons, vaginal creams, and birth control foam or jelly 2 to 3 days before a Pap test. Try to schedule the test for at least 5 days after your menstrual period ends.   © 3930-7782 [employer]. [address] [place] [address], [place] [address]. All rights reserved. This information is not intended as a substitute for professional medical care. Always follow your healthcare professional's instructions.

## 2020-09-01 NOTE — PROGRESS NOTES
"CC: Vaginal discharge    Zak Wheeler is a 25 y.o. female  presents with complaint of gray vaginal discharge with foul odor and urgency times 2 weeks     Past Medical History:   Diagnosis Date    ADHD (attention deficit hyperactivity disorder)     Anxiety     Asthma     IBS (irritable colon syndrome)      Past Surgical History:   Procedure Laterality Date    TYMPANOSTOMY TUBE PLACEMENT Bilateral     age of 6 or 7      Social History     Tobacco Use    Smoking status: Never Smoker    Smokeless tobacco: Never Used   Substance Use Topics    Alcohol use: Yes     Comment: socially     Drug use: No     Family History   Problem Relation Age of Onset    Thyroid disease Mother     No Known Problems Father     Breast cancer Neg Hx     Colon cancer Neg Hx     Uterine cancer Neg Hx     Ovarian cancer Neg Hx      OB History    Para Term  AB Living   0 0 0 0 0 0   SAB TAB Ectopic Multiple Live Births   0 0 0 0 0       /60   Ht 5' 4" (1.626 m)   Wt 92.3 kg (203 lb 7.8 oz)   LMP 2020   BMI 34.93 kg/m²     ROS:  Review of Systems   Constitutional: Negative.    HENT: Negative.    Eyes: Negative.    Respiratory: Negative.    Cardiovascular: Negative.    Gastrointestinal: Negative.    Endocrine: Negative.    Genitourinary:        Vaginal discharge   Urgency    Musculoskeletal: Negative.    Skin: Negative.    Allergic/Immunologic: Negative.    Neurological: Negative.    Hematological: Negative.    Psychiatric/Behavioral: Negative.          PHYSICAL EXAM:  Physical Exam  Constitutional:       Appearance: She is well-developed.   Genitourinary:      Pelvic exam was performed with patient supine.      Vulva, inguinal canal, urethra, bladder, vagina, uterus and rectum normal.      Genitourinary Comments: Wet prep negative    HENT:      Head: Normocephalic.      Nose: Nose normal.      Mouth/Throat:      Mouth: Mucous membranes are moist.   Eyes:      Extraocular Movements: Extraocular " movements intact.   Neck:      Musculoskeletal: Normal range of motion.   Pulmonary:      Effort: Pulmonary effort is normal.   Abdominal:      General: Abdomen is flat.      Palpations: Abdomen is soft.   Musculoskeletal: Normal range of motion.   Neurological:      Mental Status: She is alert and oriented to person, place, and time.   Skin:     General: Skin is warm and dry.   Psychiatric:         Mood and Affect: Mood normal.         Behavior: Behavior normal.         Thought Content: Thought content normal.         Judgment: Judgment normal.   Vitals signs reviewed.             ASSESSMENT and PLAN:    ICD-10-CM ICD-9-CM    1. Urgency of urination  R39.15 788.63 POCT Urine Pregnancy      POCT WET PREP      POCT URINE DIPSTICK WITHOUT MICROSCOPE      Urine culture

## 2020-09-03 LAB — BACTERIA UR CULT: ABNORMAL

## 2020-09-08 ENCOUNTER — PROCEDURE VISIT (OUTPATIENT)
Dept: OBSTETRICS AND GYNECOLOGY | Facility: CLINIC | Age: 25
End: 2020-09-08
Payer: COMMERCIAL

## 2020-09-08 VITALS
DIASTOLIC BLOOD PRESSURE: 78 MMHG | SYSTOLIC BLOOD PRESSURE: 114 MMHG | BODY MASS INDEX: 34.97 KG/M2 | WEIGHT: 203.69 LBS

## 2020-09-08 DIAGNOSIS — R87.612 PAP SMEAR ABNORMALITY OF CERVIX WITH LGSIL: Primary | ICD-10-CM

## 2020-09-08 PROCEDURE — 88305 TISSUE EXAM BY PATHOLOGIST: CPT | Mod: 26,,, | Performed by: PATHOLOGY

## 2020-09-08 PROCEDURE — 81025 URINE PREGNANCY TEST: CPT | Mod: S$GLB,,, | Performed by: NURSE PRACTITIONER

## 2020-09-08 PROCEDURE — 57454 COLPOSCOPY: ICD-10-PCS | Mod: S$GLB,,, | Performed by: NURSE PRACTITIONER

## 2020-09-08 PROCEDURE — 90651 HPV VACCINE 9-VALENT 3 DOSE IM: ICD-10-PCS | Mod: S$GLB,,, | Performed by: NURSE PRACTITIONER

## 2020-09-08 PROCEDURE — 90651 9VHPV VACCINE 2/3 DOSE IM: CPT | Mod: S$GLB,,, | Performed by: NURSE PRACTITIONER

## 2020-09-08 PROCEDURE — 90471 IMMUNIZATION ADMIN: CPT | Mod: S$GLB,,, | Performed by: NURSE PRACTITIONER

## 2020-09-08 PROCEDURE — 81025 PR  URINE PREGNANCY TEST: ICD-10-PCS | Mod: S$GLB,,, | Performed by: NURSE PRACTITIONER

## 2020-09-08 PROCEDURE — 88305 TISSUE EXAM BY PATHOLOGIST: ICD-10-PCS | Mod: 26,,, | Performed by: PATHOLOGY

## 2020-09-08 PROCEDURE — 57454 BX/CURETT OF CERVIX W/SCOPE: CPT | Mod: S$GLB,,, | Performed by: NURSE PRACTITIONER

## 2020-09-08 PROCEDURE — 88305 TISSUE EXAM BY PATHOLOGIST: CPT | Mod: 59 | Performed by: PATHOLOGY

## 2020-09-08 PROCEDURE — 90471 HPV VACCINE 9-VALENT 3 DOSE IM: ICD-10-PCS | Mod: S$GLB,,, | Performed by: NURSE PRACTITIONER

## 2020-09-08 NOTE — PROGRESS NOTES
Verified pt by two identifiers: name and date of birth.Allergies and medications reviewed.     Gardasil #1/3 0.5 ml Given IM to right ventrogluteal  using aseptic technique. No discomfort noted, pt tolerated well. Advised to wait 15 minutes in clinic to monitor. #2/3 and 3/3 scheduled. Patient verbalized understanding.

## 2020-09-08 NOTE — PROCEDURES
Biopsy (Gynecological)    Date/Time: 9/8/2020 10:45 AM  Performed by: Ayana Mccartney NP  Authorized by: Ayana Mccartney NP     Consent Done?:  Yes (Written)   Patient was prepped and draped in the normal sterile fashion.  Local anesthesia used?: No      Biopsy Location:  Cervix  Cervix:     Site:  12 00 and 9 00   Patient tolerated the procedure well with no immediate complications.     Desires HPV dose #1

## 2020-09-09 ENCOUNTER — TELEPHONE (OUTPATIENT)
Dept: OBSTETRICS AND GYNECOLOGY | Facility: CLINIC | Age: 25
End: 2020-09-09

## 2020-09-09 NOTE — TELEPHONE ENCOUNTER
----- Message from Minna Mccabe sent at 9/9/2020  5:00 PM CDT -----  Regarding: UTI medication request  Pt calling to request a call back concerning her UTI medication. Pt has not received any meds yet.    [facility] phone [phone]  on [address]      Pt can be reached at [phone]    Thank you!

## 2020-09-09 NOTE — TELEPHONE ENCOUNTER
Patient states she was told she has UTI and has been waiting for medication to be sent to her pharmacy.  Will send message to BEE Mccartney for advice.

## 2020-09-09 NOTE — PROCEDURES
Colposcopy    Date/Time: 9/8/2020 10:45 AM  Performed by: Ayana Mccartney NP  Authorized by: Ayana Mccartney NP     Consent Done?:  Yes (Verbal) and Yes (Written)  Timeout:Immediately prior to procedure a time out was called to verify the correct patient, procedure, equipment, support staff and site/side marked as required  Prep:Patient was prepped and draped in the usual sterile fashion  Assistants?: Yes    List of assistants:  Cassandra LERMA was present for the entire procedure.    Colposcopy Site:  Cervix  Position:  Supine   Patient was prepped and draped in the normal sterile fashion.  Acrowhite Lesion? Yes    Atypical Vessels: No    Transformation Zone Adequate?: Yes    Biopsy?: Yes         Location:  Cervix ((9 00 and 12 00))  ECC Performed?: Yes    LEEP Performed?: No     Patient tolerated the procedure well with no immediate complications.   Post-operative instructions were provided for the patient.   Patient was discharged and will follow up if any complications occur     Mosaicism seen at 12 o'clock location

## 2020-09-10 ENCOUNTER — TELEPHONE (OUTPATIENT)
Dept: OBSTETRICS AND GYNECOLOGY | Facility: CLINIC | Age: 25
End: 2020-09-10

## 2020-09-10 DIAGNOSIS — N39.0 URINARY TRACT INFECTION WITHOUT HEMATURIA, SITE UNSPECIFIED: Primary | ICD-10-CM

## 2020-09-10 RX ORDER — NITROFURANTOIN 25; 75 MG/1; MG/1
100 CAPSULE ORAL 2 TIMES DAILY
Qty: 14 CAPSULE | Refills: 0 | Status: SHIPPED | OUTPATIENT
Start: 2020-09-10 | End: 2020-09-17

## 2020-09-10 NOTE — TELEPHONE ENCOUNTER
Attempted to contact patient. No answer, unable to leave voice mail.     Please advise. Please let client know that a prescription has been sent to her pharmacy.

## 2020-09-10 NOTE — TELEPHONE ENCOUNTER
Spoke with patient, advised patient that   Please contact client and inform her that her urine culture preliminary has been received confirming that she has an uti but the final report has not been received which will inform us of which antibiotic to use to resolve uti. If client would like to start on an antibiotic now with out the final report please let me know and I will a prescription to her pharmacy . Patient voiced understanding. Patient stated that she would like an antibiotic called in now to her pharmacy.

## 2020-09-10 NOTE — TELEPHONE ENCOUNTER
----- Message from Angela Mullen sent at 9/10/2020  2:22 PM CDT -----  .Type:  Patient Returning Call    Who Called:pt  Who Left Message for Patient:Rena  Does the patient know what this is regarding?:no  Would the patient rather a call back or a response via [facility]? Call back  Best Call Back Number:[phone]  Additional Information:

## 2020-09-10 NOTE — TELEPHONE ENCOUNTER
Please contact client and inform her that her urine culture preliminary has been received confirming that she has an uti but the final report has not been received which will inform us of which antibiotic to use to resolve uti. If client would like to start on an antibiotic now with out the final report please let me know and I will a prescription to her pharmacy

## 2020-09-16 ENCOUNTER — TELEPHONE (OUTPATIENT)
Dept: OBSTETRICS AND GYNECOLOGY | Facility: CLINIC | Age: 25
End: 2020-09-16

## 2020-09-16 LAB
FINAL PATHOLOGIC DIAGNOSIS: NORMAL
GROSS: NORMAL

## 2020-09-16 NOTE — TELEPHONE ENCOUNTER
Spoke with client   2 client identifiers verified   Informed client of colpo result WILL 1 and ECC negative  Verbalizes understanding cotesting in 12 months.   Desires appt for Gardisil dose #2 and repeat urine culture.Would like both appt on the same day.     Appt scheduled 11/10/2020 at 8am.

## 2020-09-23 ENCOUNTER — OFFICE VISIT (OUTPATIENT)
Dept: OBSTETRICS AND GYNECOLOGY | Facility: CLINIC | Age: 25
End: 2020-09-23
Payer: COMMERCIAL

## 2020-09-23 ENCOUNTER — TELEPHONE (OUTPATIENT)
Dept: OBSTETRICS AND GYNECOLOGY | Facility: CLINIC | Age: 25
End: 2020-09-23

## 2020-09-23 VITALS
DIASTOLIC BLOOD PRESSURE: 84 MMHG | BODY MASS INDEX: 34.4 KG/M2 | WEIGHT: 201.5 LBS | SYSTOLIC BLOOD PRESSURE: 126 MMHG | HEIGHT: 64 IN

## 2020-09-23 DIAGNOSIS — F32.A DEPRESSION, UNSPECIFIED DEPRESSION TYPE: ICD-10-CM

## 2020-09-23 DIAGNOSIS — Z30.011 ENCOUNTER FOR INITIAL PRESCRIPTION OF CONTRACEPTIVE PILLS: Primary | ICD-10-CM

## 2020-09-23 PROCEDURE — 99212 OFFICE O/P EST SF 10 MIN: CPT | Mod: S$GLB,,, | Performed by: NURSE PRACTITIONER

## 2020-09-23 PROCEDURE — 99999 PR PBB SHADOW E&M-EST. PATIENT-LVL III: CPT | Mod: PBBFAC,,, | Performed by: NURSE PRACTITIONER

## 2020-09-23 PROCEDURE — 99999 PR PBB SHADOW E&M-EST. PATIENT-LVL III: ICD-10-PCS | Mod: PBBFAC,,, | Performed by: NURSE PRACTITIONER

## 2020-09-23 PROCEDURE — 99212 PR OFFICE/OUTPT VISIT, EST, LEVL II, 10-19 MIN: ICD-10-PCS | Mod: S$GLB,,, | Performed by: NURSE PRACTITIONER

## 2020-09-23 RX ORDER — NORGESTIMATE AND ETHINYL ESTRADIOL 0.25-0.035
1 KIT ORAL DAILY
Qty: 28 TABLET | Refills: 2 | Status: SHIPPED | OUTPATIENT
Start: 2020-09-23 | End: 2020-11-11

## 2020-09-23 RX ORDER — SERTRALINE HYDROCHLORIDE 50 MG/1
50 TABLET, FILM COATED ORAL DAILY
Qty: 30 TABLET | Refills: 2 | Status: SHIPPED | OUTPATIENT
Start: 2020-09-23 | End: 2020-12-02 | Stop reason: SDUPTHER

## 2020-09-23 RX ORDER — BUPROPION HYDROCHLORIDE 150 MG/1
TABLET ORAL
COMMUNITY
End: 2020-12-02

## 2020-09-23 NOTE — PROGRESS NOTES
"CC:Birth control and depression (denies suicidal)    Zak Wheeler is a 25 y.o. female   Desires birth control pill to help with acne  Desires something to help with depression/anxiety (tearful during office visit but statedsthat she does not know why)  Has taken Wellbutrin in the past but states that it did not work for her     Past Medical History:   Diagnosis Date    ADHD (attention deficit hyperactivity disorder)     Anxiety     Asthma     IBS (irritable colon syndrome)      Past Surgical History:   Procedure Laterality Date    TYMPANOSTOMY TUBE PLACEMENT Bilateral     age of 6 or 7      Social History     Tobacco Use    Smoking status: Never Smoker    Smokeless tobacco: Never Used   Substance Use Topics    Alcohol use: Yes     Comment: socially     Drug use: No     Family History   Problem Relation Age of Onset    Thyroid disease Mother     No Known Problems Father     Breast cancer Neg Hx     Colon cancer Neg Hx     Uterine cancer Neg Hx     Ovarian cancer Neg Hx      OB History    Para Term  AB Living   0 0 0 0 0 0   SAB TAB Ectopic Multiple Live Births   0 0 0 0 0       /84   Ht 5' 4" (1.626 m)   Wt 91.4 kg (201 lb 8 oz)   LMP 2020   BMI 34.59 kg/m²     ROS:  Review of Systems      PHYSICAL EXAM:  OBGyn Exam        ASSESSMENT and PLAN:    ICD-10-CM ICD-9-CM    1. Encounter for initial prescription of contraceptive pills  Z30.011 V25.01 norgestimate-ethinyl estradioL (ORTHO-CYCLEN) 0.25-35 mg-mcg per tablet   2. Depression, unspecified depression type  F32.9 311 sertraline (ZOLOFT) 50 MG tablet   return in 3 months for re-evaluation    "

## 2020-09-24 NOTE — TELEPHONE ENCOUNTER
Appointment scheduled for 10/7 at 1:20 with Dr. Cevallos at [facility].  Attempted to call patient, no answer, voicemail not set up at [phone].  Called [phone], not correct.

## 2020-09-24 NOTE — TELEPHONE ENCOUNTER
Client is in need of new PCP.   Would it be possible for her to schedule an appt with Debora Cevallos?  If so please call client and schedule an appt with Dr. Cevallos. If is not possible please call client and let her know.

## 2020-11-11 ENCOUNTER — OFFICE VISIT (OUTPATIENT)
Dept: OBSTETRICS AND GYNECOLOGY | Facility: CLINIC | Age: 25
End: 2020-11-11
Payer: COMMERCIAL

## 2020-11-11 VITALS
WEIGHT: 202 LBS | HEIGHT: 64 IN | SYSTOLIC BLOOD PRESSURE: 126 MMHG | BODY MASS INDEX: 34.49 KG/M2 | DIASTOLIC BLOOD PRESSURE: 82 MMHG

## 2020-11-11 DIAGNOSIS — Z23 NEED FOR HPV VACCINE: ICD-10-CM

## 2020-11-11 DIAGNOSIS — R39.9 URINARY SYMPTOM OR SIGN: Primary | ICD-10-CM

## 2020-11-11 DIAGNOSIS — N89.8 VAGINAL ODOR: ICD-10-CM

## 2020-11-11 PROCEDURE — 99213 OFFICE O/P EST LOW 20 MIN: CPT | Mod: 25,S$GLB,, | Performed by: NURSE PRACTITIONER

## 2020-11-11 PROCEDURE — 81025 URINE PREGNANCY TEST: CPT | Mod: S$GLB,,, | Performed by: NURSE PRACTITIONER

## 2020-11-11 PROCEDURE — 90651 9VHPV VACCINE 2/3 DOSE IM: CPT | Mod: S$GLB,,, | Performed by: NURSE PRACTITIONER

## 2020-11-11 PROCEDURE — 90651 HPV VACCINE 9-VALENT 3 DOSE IM: ICD-10-PCS | Mod: S$GLB,,, | Performed by: NURSE PRACTITIONER

## 2020-11-11 PROCEDURE — 99999 PR PBB SHADOW E&M-EST. PATIENT-LVL III: ICD-10-PCS | Mod: PBBFAC,,, | Performed by: NURSE PRACTITIONER

## 2020-11-11 PROCEDURE — 99999 PR PBB SHADOW E&M-EST. PATIENT-LVL III: CPT | Mod: PBBFAC,,, | Performed by: NURSE PRACTITIONER

## 2020-11-11 PROCEDURE — 99213 PR OFFICE/OUTPT VISIT, EST, LEVL III, 20-29 MIN: ICD-10-PCS | Mod: 25,S$GLB,, | Performed by: NURSE PRACTITIONER

## 2020-11-11 PROCEDURE — 87210 PR  SMEAR,STAIN,WET MNT,INTERP: ICD-10-PCS | Mod: QW,S$GLB,, | Performed by: NURSE PRACTITIONER

## 2020-11-11 PROCEDURE — 81025 PR  URINE PREGNANCY TEST: ICD-10-PCS | Mod: S$GLB,,, | Performed by: NURSE PRACTITIONER

## 2020-11-11 PROCEDURE — 90471 HPV VACCINE 9-VALENT 3 DOSE IM: ICD-10-PCS | Mod: S$GLB,,, | Performed by: NURSE PRACTITIONER

## 2020-11-11 PROCEDURE — 90471 IMMUNIZATION ADMIN: CPT | Mod: S$GLB,,, | Performed by: NURSE PRACTITIONER

## 2020-11-11 PROCEDURE — 87210 SMEAR WET MOUNT SALINE/INK: CPT | Mod: QW,S$GLB,, | Performed by: NURSE PRACTITIONER

## 2020-11-11 PROCEDURE — 87086 URINE CULTURE/COLONY COUNT: CPT

## 2020-11-11 RX ORDER — SULFAMETHOXAZOLE AND TRIMETHOPRIM 400; 80 MG/1; MG/1
1 TABLET ORAL 2 TIMES DAILY
Qty: 20 TABLET | Refills: 0 | Status: SHIPPED | OUTPATIENT
Start: 2020-11-11 | End: 2020-11-21

## 2020-11-11 NOTE — PATIENT INSTRUCTIONS
Bacterial Vaginosis    You have a vaginal infection called bacterial vaginosis (BV). Both good and bad bacteria are present in a healthy vagina. BV occurs when these bacteria get out of balance. The number of bad bacteria increase. And the number of good bacteria decrease.  BV may or may not cause symptoms. If symptoms do occur, they can include:  · Thin, gray, milky-white, or sometimes green discharge  · Unpleasant odor or fishy smell  · Itching, burning, or pain in or around the vagina  It is not known what causes BV, but certain factors can make the problem more likely. This can include:  · Douching  · Having sex with a new partner  · Having sex with more than one partner  BV will sometimes go away on its own. But treatment is usually recommended. This is because untreated BV can increase the risk of more serious health problems such as:  · Pelvic inflammatory disease (PID)  ·  delivery (giving birth to a baby early if youre pregnant)  · HIV and certain other sexually transmitted diseases (STDs)  · Infection after surgery on the reproductive organs  Home care  General care  · BV is most often treated with medicines called antibiotics. These may be given as pills or as a vaginal cream. If antibiotics are prescribed, be sure to use them exactly as directed. Also, be sure to complete all of the medicine, even if your symptoms go away.  · Avoid douching or having sex during treatment.  · If you have sex with a female partner, ask your healthcare provider if she should also be treated.  Prevention  · Limit or avoid douching.  · Avoid having sex. If you do have sex, then take steps to lower your risk:  ¨ Use condoms when having sex.  ¨ Limit the number of partners you have sex with.  Follow-up care  Follow up with your healthcare provider, or as advised.  When to seek medical advice  Call your healthcare provider right away if:  · You have a fever of 100.4ºF (38ºC) or higher, or as directed by your  "provider.  · Your symptoms worsen, or they dont go away within a few days of starting treatment.  · You have new pain in the lower belly or pelvic region.  · You have side effects that bother you or a reaction to the pills or cream youre prescribed.  · You or any partners you have sex with have new symptoms, such as a rash, joint pain, or sores.  Date Last Reviewed: 7/30/2015  © 1501-7317 [employer]. [address] [place] [address], [place] [address]. All rights reserved. This information is not intended as a substitute for professional medical care. Always follow your healthcare professional's instructions.            Bladder Infection, Female (Adult)    Urine is normally doesn't have any bacteria in it. But bacteria can get into the urinary tract from the skin around the rectum. Or they can travel in the blood from elsewhere in the body. Once they are in your urinary tract, they can cause infection in the urethra (urethritis), the bladder (cystitis), or the kidneys (pyelonephritis).  The most common place for an infection is in the bladder. This is called a bladder infection. This is one of the most common infections in women. Most bladder infections are easily treated. They are not serious unless the infection spreads to the kidney.  The phrases "bladder infection," "UTI," and "cystitis" are often used to describe the same thing. But they are not always the same. Cystitis is an inflammation of the bladder. The most common cause of cystitis is an infection.  Symptoms  The infection causes inflammation in the urethra and bladder. This causes many of the symptoms. The most common symptoms of a bladder infection are:  · Pain or burning when urinating  · Having to urinate more often than usual  · Urgent need to urinate  · Only a small amount of urine comes out  · Blood in urine  · Abdominal discomfort. This is usually in the lower abdomen above the pubic bone.  · Cloudy urine  · Strong- or bad-smelling " urine  · Unable to urinate (urinary retention)  · Unable to hold urine in (urinary incontinence)  · Fever  · Loss of appetite  · Confusion (in older adults)  Causes  Bladder infections are not contagious. You can't get one from someone else, from a toilet seat, or from sharing a bath.  The most common cause of bladder infections is bacteria from the bowels. The bacteria get onto the skin around the opening of the urethra. From there, they can get into the urine and travel up to the bladder, causing inflammation and infection. This usually happens because of:  · Wiping improperly after urinating. Always wipe from front to back.  · Bowel incontinence  · Pregnancy  · Procedures such as having a catheter inserted  · Older age  · Not emptying your bladder. This can allow bacteria a chance to grow in your urine.  · Dehydration  · Constipation  · Sex  · Use of a diaphragm for birth control   Treatment  Bladder infections are diagnosed by a urine test. They are treated with antibiotics and usually clear up quickly without complications. Treatment helps prevent a more serious kidney infection.  Medicines  Medicines can help in the treatment of a bladder infection:  · Take antibiotics until they are used up, even if you feel better. It is important to finish them to make sure the infection has cleared.  · You can use acetaminophen or ibuprofen for pain, fever, or discomfort, unless another medicine was prescribed. If you have chronic liver or kidney disease, talk with your healthcare provider before using these medicines. Also talk with your provider if you've ever had a stomach ulcer or gastrointestinal bleeding, or are taking blood-thinner medicines.  · If you are given phenazopydridine to reduce burning with urination, it will cause your urine to become a bright orange color. This can stain clothing.  Care and prevention  These self-care steps can help prevent future infections:  · Drink plenty of fluids to prevent  dehydration and flush out your bladder. Do this unless you must restrict fluids for other health reasons, or your doctor told you not to.  · Proper cleaning after going to the bathroom is important. Wipe from front to back after using the toilet to prevent the spread of bacteria.  · Urinate more often. Don't try to hold urine in for a long time.  · Wear loose-fitting clothes and cotton underwear. Avoid tight-fitting pants.  · Improve your diet and prevent constipation. Eat more fresh fruit and vegetables, and fiber, and less junk and fatty foods.  · Avoid sex until your symptoms are gone.  · Avoid caffeine, alcohol, and spicy foods. These can irritate your bladder.  · Urinate right after intercourse to flush out your bladder.  · If you use birth control pills and have frequent bladder infections, discuss it with your doctor.  Follow-up care  Call your healthcare provider if all symptoms are not gone after 3 days of treatment. This is especially important if you have repeat infections.  If a culture was done, you will be told if your treatment needs to be changed. If directed, you can call to find out the results.  If X-rays were done, you will be told if the results will affect your treatment.  Call 911  Call 911 if any of the following occur:  · Trouble breathing  · Hard to wake up or confusion  · Fainting or loss of consciousness  · Rapid heart rate  When to seek medical advice  Call your healthcare provider right away if any of these occur:  · Fever of 100.4ºF (38.0ºC) or higher, or as directed by your healthcare provider  · Symptoms are not better by the third day of treatment  · Back or belly (abdominal) pain that gets worse  · Repeated vomiting, or unable to keep medicine down  · Weakness or dizziness  · Vaginal discharge  · Pain, redness, or swelling in the outer vaginal area (labia)  Date Last Reviewed: 10/1/2016  © 4110-8195 [employer]. [address] [place] [address], [place] [address]. All rights  reserved. This information is not intended as a substitute for professional medical care. Always follow your healthcare professional's instructions.

## 2020-11-11 NOTE — PROGRESS NOTES
After using two patient identifiers and reviewing allergies and medications. Pt. Received HPV vaccine. Instructed to wait 15 minutes. Next appt. Made.

## 2020-11-11 NOTE — PROGRESS NOTES
"CC: Zak Wheeler is a 25 y.o. female    Continue to complain of malodorous urine, previously treated with Macrobid but client states that urinary symptoms persist   Not sure if odor is vaginal or urinary   Past Medical History:   Diagnosis Date    ADHD (attention deficit hyperactivity disorder)     Anxiety     Asthma     IBS (irritable colon syndrome)      Past Surgical History:   Procedure Laterality Date    TYMPANOSTOMY TUBE PLACEMENT Bilateral     age of 6 or 7      Social History     Tobacco Use    Smoking status: Never Smoker    Smokeless tobacco: Never Used   Substance Use Topics    Alcohol use: Yes     Comment: socially     Drug use: No     Family History   Problem Relation Age of Onset    Thyroid disease Mother     No Known Problems Father     Breast cancer Neg Hx     Colon cancer Neg Hx     Uterine cancer Neg Hx     Ovarian cancer Neg Hx      OB History    Para Term  AB Living   0 0 0 0 0 0   SAB TAB Ectopic Multiple Live Births   0 0 0 0 0       /82   Ht 5' 4" (1.626 m)   Wt 91.6 kg (202 lb)   LMP 10/23/2020   BMI 34.67 kg/m²     ROS:  Review of Systems   Genitourinary:        Vaginal discharge  Malodorous urine             PHYSICAL EXAM:  Physical Exam  Genitourinary:      Pelvic exam was performed with patient supine.      Vulva, inguinal canal, urethra and bladder normal.      Genitourinary Comments: Wet prep negative              ASSESSMENT and PLAN:    ICD-10-CM ICD-9-CM    1. Urinary symptom or sign  R39.9 788.99 Urine culture      sulfamethoxazole-trimethoprim 400-80mg (BACTRIM) 400-80 mg per tablet   2. Vaginal odor  N89.8 625.8 POCT WET PREP   3. Need for HPV vaccine  Z23 V04.89 (In Office Administered) HPV Vaccine (9-Valent) (3 Dose) (IM)     Wet prep negative - symptome may be related to urinary   HPV dose #2 due today   Will treat with Bactrim today but client understand that antibiotic may be changed pending urine culture results   "

## 2020-11-13 ENCOUNTER — PATIENT MESSAGE (OUTPATIENT)
Dept: OBSTETRICS AND GYNECOLOGY | Facility: CLINIC | Age: 25
End: 2020-11-13

## 2020-11-13 DIAGNOSIS — N89.8 VAGINAL ITCHING: Primary | ICD-10-CM

## 2020-11-13 LAB — BACTERIA UR CULT: NO GROWTH

## 2020-11-13 RX ORDER — FLUCONAZOLE 100 MG/1
150 TABLET ORAL ONCE
Qty: 2 TABLET | Refills: 0 | Status: SHIPPED | OUTPATIENT
Start: 2020-11-13 | End: 2020-11-13

## 2020-12-01 ENCOUNTER — TELEPHONE (OUTPATIENT)
Dept: OBSTETRICS AND GYNECOLOGY | Facility: CLINIC | Age: 25
End: 2020-12-01

## 2020-12-01 NOTE — TELEPHONE ENCOUNTER
Returned call to patient.  She requested an appt for a new pregnancy.  (+) upt.  Offered [facility], she accepted.  Appt scheduled for 12/02/20 at 10:00 am.  She confirmed appt, provider and location.  She verbalized understanding of the current visitor and mask policies.

## 2020-12-01 NOTE — TELEPHONE ENCOUNTER
----- Message from Mike Espinosa sent at 12/1/2020  7:28 AM CST -----  Contact: self  Requesting call back regarding getting appt to confirm pregnancy. Please call back at [phone].

## 2020-12-02 ENCOUNTER — LAB VISIT (OUTPATIENT)
Dept: LAB | Facility: HOSPITAL | Age: 25
End: 2020-12-02
Attending: ADVANCED PRACTICE MIDWIFE
Payer: COMMERCIAL

## 2020-12-02 ENCOUNTER — OFFICE VISIT (OUTPATIENT)
Dept: OBSTETRICS AND GYNECOLOGY | Facility: CLINIC | Age: 25
End: 2020-12-02
Payer: COMMERCIAL

## 2020-12-02 ENCOUNTER — PATIENT MESSAGE (OUTPATIENT)
Dept: OBSTETRICS AND GYNECOLOGY | Facility: CLINIC | Age: 25
End: 2020-12-02

## 2020-12-02 ENCOUNTER — PROCEDURE VISIT (OUTPATIENT)
Dept: OBSTETRICS AND GYNECOLOGY | Facility: CLINIC | Age: 25
End: 2020-12-02
Payer: COMMERCIAL

## 2020-12-02 VITALS
SYSTOLIC BLOOD PRESSURE: 138 MMHG | WEIGHT: 206 LBS | BODY MASS INDEX: 35.17 KG/M2 | HEIGHT: 64 IN | DIASTOLIC BLOOD PRESSURE: 88 MMHG

## 2020-12-02 DIAGNOSIS — Z34.00 PRIMIGRAVIDA, ANTEPARTUM: ICD-10-CM

## 2020-12-02 DIAGNOSIS — O36.80X0 PREGNANCY WITH INCONCLUSIVE FETAL VIABILITY, SINGLE OR UNSPECIFIED FETUS: Primary | ICD-10-CM

## 2020-12-02 DIAGNOSIS — O36.80X0 PREGNANCY WITH INCONCLUSIVE FETAL VIABILITY, SINGLE OR UNSPECIFIED FETUS: ICD-10-CM

## 2020-12-02 DIAGNOSIS — R87.612 PAP SMEAR ABNORMALITY OF CERVIX WITH LGSIL: ICD-10-CM

## 2020-12-02 DIAGNOSIS — F90.9 ATTENTION DEFICIT HYPERACTIVITY DISORDER (ADHD), UNSPECIFIED ADHD TYPE: ICD-10-CM

## 2020-12-02 DIAGNOSIS — F41.9 ANXIETY: ICD-10-CM

## 2020-12-02 DIAGNOSIS — F32.A DEPRESSION, UNSPECIFIED DEPRESSION TYPE: ICD-10-CM

## 2020-12-02 LAB
ABO + RH BLD: NORMAL
BASOPHILS # BLD AUTO: 0.03 K/UL (ref 0–0.2)
BASOPHILS NFR BLD: 0.6 % (ref 0–1.9)
BILIRUB UR QL STRIP: NEGATIVE
BLD GP AB SCN CELLS X3 SERPL QL: NORMAL
CLARITY UR REFRACT.AUTO: CLEAR
COLOR UR AUTO: YELLOW
DIFFERENTIAL METHOD: ABNORMAL
EOSINOPHIL # BLD AUTO: 0.3 K/UL (ref 0–0.5)
EOSINOPHIL NFR BLD: 5.1 % (ref 0–8)
ERYTHROCYTE [DISTWIDTH] IN BLOOD BY AUTOMATED COUNT: 14.4 % (ref 11.5–14.5)
GLUCOSE UR QL STRIP: NEGATIVE
HCT VFR BLD AUTO: 37.2 % (ref 37–48.5)
HGB BLD-MCNC: 11.3 G/DL (ref 12–16)
HGB S BLD QL SOLY: NEGATIVE
HGB UR QL STRIP: NEGATIVE
IMM GRANULOCYTES # BLD AUTO: 0.01 K/UL (ref 0–0.04)
IMM GRANULOCYTES NFR BLD AUTO: 0.2 % (ref 0–0.5)
KETONES UR QL STRIP: ABNORMAL
LEUKOCYTE ESTERASE UR QL STRIP: NEGATIVE
LYMPHOCYTES # BLD AUTO: 1.7 K/UL (ref 1–4.8)
LYMPHOCYTES NFR BLD: 35 % (ref 18–48)
MCH RBC QN AUTO: 27.6 PG (ref 27–31)
MCHC RBC AUTO-ENTMCNC: 30.4 G/DL (ref 32–36)
MCV RBC AUTO: 91 FL (ref 82–98)
MONOCYTES # BLD AUTO: 0.4 K/UL (ref 0.3–1)
MONOCYTES NFR BLD: 7.4 % (ref 4–15)
NEUTROPHILS # BLD AUTO: 2.5 K/UL (ref 1.8–7.7)
NEUTROPHILS NFR BLD: 51.7 % (ref 38–73)
NITRITE UR QL STRIP: NEGATIVE
NRBC BLD-RTO: 0 /100 WBC
PH UR STRIP: 6 [PH] (ref 5–8)
PLATELET # BLD AUTO: 274 K/UL (ref 150–350)
PMV BLD AUTO: 10.7 FL (ref 9.2–12.9)
PROT UR QL STRIP: NEGATIVE
RBC # BLD AUTO: 4.09 M/UL (ref 4–5.4)
SP GR UR STRIP: 1.02 (ref 1–1.03)
URN SPEC COLLECT METH UR: ABNORMAL
WBC # BLD AUTO: 4.86 K/UL (ref 3.9–12.7)

## 2020-12-02 PROCEDURE — 87491 CHLMYD TRACH DNA AMP PROBE: CPT

## 2020-12-02 PROCEDURE — 99999 PR PBB SHADOW E&M-EST. PATIENT-LVL III: ICD-10-PCS | Mod: PBBFAC,,, | Performed by: ADVANCED PRACTICE MIDWIFE

## 2020-12-02 PROCEDURE — 99214 OFFICE O/P EST MOD 30 MIN: CPT | Mod: S$GLB,,, | Performed by: ADVANCED PRACTICE MIDWIFE

## 2020-12-02 PROCEDURE — 86762 RUBELLA ANTIBODY: CPT

## 2020-12-02 PROCEDURE — 85660 RBC SICKLE CELL TEST: CPT

## 2020-12-02 PROCEDURE — 36415 COLL VENOUS BLD VENIPUNCTURE: CPT | Mod: PN

## 2020-12-02 PROCEDURE — 99999 PR PBB SHADOW E&M-EST. PATIENT-LVL III: CPT | Mod: PBBFAC,,, | Performed by: ADVANCED PRACTICE MIDWIFE

## 2020-12-02 PROCEDURE — 80074 ACUTE HEPATITIS PANEL: CPT

## 2020-12-02 PROCEDURE — 86703 HIV-1/HIV-2 1 RESULT ANTBDY: CPT

## 2020-12-02 PROCEDURE — 99214 PR OFFICE/OUTPT VISIT, EST, LEVL IV, 30-39 MIN: ICD-10-PCS | Mod: S$GLB,,, | Performed by: ADVANCED PRACTICE MIDWIFE

## 2020-12-02 PROCEDURE — 76801 PR US, OB <14WKS, TRANSABD, SINGLE GESTATION: ICD-10-PCS | Mod: 26,S$GLB,, | Performed by: OBSTETRICS & GYNECOLOGY

## 2020-12-02 PROCEDURE — 86901 BLOOD TYPING SEROLOGIC RH(D): CPT

## 2020-12-02 PROCEDURE — 76801 OB US < 14 WKS SINGLE FETUS: CPT | Mod: 26,S$GLB,, | Performed by: OBSTETRICS & GYNECOLOGY

## 2020-12-02 PROCEDURE — 86592 SYPHILIS TEST NON-TREP QUAL: CPT

## 2020-12-02 PROCEDURE — 85025 COMPLETE CBC W/AUTO DIFF WBC: CPT

## 2020-12-02 PROCEDURE — 81003 URINALYSIS AUTO W/O SCOPE: CPT

## 2020-12-02 RX ORDER — SERTRALINE HYDROCHLORIDE 50 MG/1
50 TABLET, FILM COATED ORAL DAILY
Qty: 30 TABLET | Refills: 11 | Status: SHIPPED | OUTPATIENT
Start: 2020-12-02 | End: 2022-05-06

## 2020-12-02 NOTE — PATIENT INSTRUCTIONS
Adapting to Pregnancy: First Trimester  As your body adjusts, you may have to change or limit your daily activities. Youll need more rest. You may also need to use the energy you have more wisely.     Eat stomach-friendly foods like cottage cheese, crackers, or bread throughout the day.   Your changing body  Almost every part of your body is affected as you adapt to pregnancy. The uterus and cervix will begin to soften right away. You may not look very pregnant during the first 3 months. But you are likely to have some common signs of early pregnancy:  · Nausea  · Fatigue  · Frequent urination  · Mood swings  · Bloating of the abdomen  · Missed or light periods (first trimester bleeding)  · Nipple or breast tenderness, breast swelling  Its not too late to start good habits  What matters most is protecting your baby from this moment on. If you smoke, drink alcohol, or use drugs, now is the time to stop. If you need help, talk with your healthcare provider.  · Smoking increases the risk of  stillbirth or having a low-birth-weight baby. If you smoke, quit now.  · Alcohol and drugs have been linked with miscarriage, birth defects, intellectual disability, and low birth weight. Do not drink alcohol or take drugs.  Tips to relieve nausea  Although nausea can happen at any time of the day, it may be worse in the morning. To help prevent nausea:  · Eat small, light meals at frequent intervals.  · Get up slowly. Eat a few unsalted crackers before you get out of bed.  · Avoid smells that bother you.  · Avoid spicy and fatty foods.  · Eat an ice pop in your favorite flavor.  · Get plenty of rest.  · Ask your healthcare provider about taking ba or vitamin B6 for nausea and vomiting.  · Talk with your healthcare provider if you take vitamins that upset your stomach.  Work concerns  The end of the first trimester is a good time to discuss working during pregnancy with your employer. Follow your healthcare providers  "advice if your job requires you to stand for a long time, work with hazardous tools, or even sit at a desk all day. Your workspace, workload, or scheduled hours may need to be adjusted. Perhaps you can change body postures more often or take an extra break.  Advice for travel  Talk to your healthcare provider first, but the second trimester may be the best time for any travel. You may be advised to avoid certain trips while youre pregnant. Food and water can be concerns in developing countries. Travel by car is a good choice, as you can stop, get out, and stretch. Bring snacks and water along. Fasten the lap belt below your belly, low over your hips. Also be sure to wear the shoulder harness.  Intimacy  Unless your healthcare provider tells you to, there is no reason to stop having sex while youre pregnant. You or your partner may notice changes in desire. Desire may be less in the first trimester, due to nausea and fatigue. In the second trimester, sex may be very enjoyable. The third trimester can be a challenge comfort-wise. Try different positions and see whats best for you both.  Date Last Reviewed: 8/16/2015  © 0027-1676 [employer]. [address] [place] [address]. All rights reserved. This information is not intended as a substitute for professional medical care. Always follow your healthcare professional's instructions.        Pregnancy: Your First Trimester Changes  The first trimester is a time of rapid development for your baby. Because your baby is growing so quickly, it is important that you start a healthy lifestyle right away. By the end of the first trimester, your baby has formed all of its major body organs and weighs just over an ounce.     Actual size of baby is 1/4"    Month 1 (Weeks 1 to 4)  The placenta (the organ that nourishes your baby) begins to form. The brain, spinal cord, heart, gastrointestinal tract, and lungs begin to develop. Your baby is about 1/4 inch " "long by the end of the first month.     Actual size of baby is 1"    Month 2 (Weeks 5 to 8)  All of your babys major body organs form. The face, fingers, toes, ears, and eyes appear. By the end of the month, your baby is about 1-inch long.     Actual size of baby is 4"    Month 3 (Weeks 9 to 12)  Your baby can open and close its fists and mouth. The sexual organs begin to form. As the first trimester ends, your baby is about 3-inches long.  Date Last Reviewed: 8/16/2015  © 3146-3351 [employer]. [address] [place] [address]. All rights reserved. This information is not intended as a substitute for professional medical care. Always follow your healthcare professional's instructions.        "

## 2020-12-02 NOTE — PROGRESS NOTES
CC: Absence of menses risk assessment Katie Valle 20    Zak Wheeler is a 25 y.o. female  presents with complaint of absence of menstruation.  She reports nausea and abdominal pain.  UPT is positive.    Menstrual History  Menarche at 14 years, length 5 days, cycle 28 days  LMP 10/23/2020 , EDC 2021, therefore 6 weeks /1 day    Past Medical History:   Diagnosis Date    ADHD (attention deficit hyperactivity disorder)     Anxiety     Asthma     IBS (irritable colon syndrome)      Past Surgical History:   Procedure Laterality Date    TYMPANOSTOMY TUBE PLACEMENT Bilateral     age of 6 or 7      Social History     Socioeconomic History    Marital status: Single     Spouse name: Not on file    Number of children: 0    Years of education: Not on file    Highest education level: Not on file   Occupational History    Occupation: student - social work     Comment: [employer]   Social Needs    Financial resource strain: Not on file    Food insecurity     Worry: Not on file     Inability: Not on file    Transportation needs     Medical: Not on file     Non-medical: Not on file   Tobacco Use    Smoking status: Never Smoker    Smokeless tobacco: Never Used   Substance and Sexual Activity    Alcohol use: Not Currently     Comment: socially     Drug use: No    Sexual activity: Yes     Partners: Male     Birth control/protection: None   Lifestyle    Physical activity     Days per week: Not on file     Minutes per session: Not on file    Stress: Not on file   Relationships    Social connections     Talks on phone: Not on file     Gets together: Not on file     Attends Shinto service: Not on file     Active member of club or organization: Not on file     Attends meetings of clubs or organizations: Not on file     Relationship status: Not on file   Other Topics Concern    Not on file   Social History Narrative    Not on file     Family History   Problem Relation Age of Onset    Thyroid disease  "Mother     No Known Problems Father     Diabetes Paternal Grandmother     Breast cancer Neg Hx     Colon cancer Neg Hx     Uterine cancer Neg Hx     Ovarian cancer Neg Hx      OB History    Para Term  AB Living   1 0 0 0 0 0   SAB TAB Ectopic Multiple Live Births   0 0 0 0 0      # Outcome Date GA Lbr [place]/2nd Weight Sex Delivery Anes PTL Lv   1 Current                /88   Ht 5' 4" (1.626 m)   Wt 93.5 kg (206 lb 0.3 oz)   LMP 10/23/2020   BMI 35.36 kg/m²         ROS:  GENERAL: Denies weight gain or weight loss. Feeling well overall.   SKIN: Denies rash or lesions.   HEAD: Denies head injury or headache.   NODES: Denies enlarged lymph nodes.   CHEST: Denies chest pain or shortness of breath. Reports difficulty taking in deep breaths.  CARDIOVASCULAR: Denies palpitations or left sided chest pain.   ABDOMEN: Reports abdominal tenderness to left lower quadrant and nausea. Denies constipation, vomiting or rectal bleeding.   URINARY: No frequency, dysuria, hematuria, or burning on urination.   REPRODUCTIVE: See HPI.   BREASTS: The patient does not perform breast self-examination. Denies pain or nipple discharge.   HEMATOLOGIC: No easy bruisability or excessive bleeding.   MUSCULOSKELETAL: Denies joint pain or swelling.   NEUROLOGIC: Denies syncope or weakness.   PSYCHIATRIC: Denies depression, anxiety or mood swings.    PE:   APPEARANCE: Well nourished, well developed, in no acute distress.  AFFECT: WNL, alert and oriented x 3.  SKIN: No acne or hirsutism.  NECK: Neck symmetric without masses or thyromegaly.  NODES: No inguinal, cervical, axillary or femoral lymph node enlargement.  CHEST: Good respiratory effort. Clear lung sounds on anterior and posterior auscultation bilaterally.   CARDIOVASCULAR: Heart rate even and regular. S1 and S2 auscultated. No murmurs or abnormal heart sounds auscultated.   ABDOMEN: Soft. Right and left lower quadrant tenderness. No masses. No hepatosplenomegaly. " No hernias.  BREASTS: Symmetrical, no skin changes or visible lesions. No palpable masses, nipple discharge bilaterally.  PELVIC: Normal external female genitalia without lesions. Normal hair distribution. Adequate perineal body, normal urethral meatus. Vagina moist and well rugated without lesions or discharge. Cervix not visualized. No significant cystocele or rectocele. Bimanual exam shows uterus is 6-8 weeks, regular, mobile and nontender. Adnexa slight tenderness noted in lower right abdomen.  EXTREMITIES: No edema.          ASSESSMENT and PLAN:  25 year old  with secondary amenorrhea and positive UPT.  LMP 10/23/2020 EDC 2021, therefore 6 weeks 1 day  Prenatal lab today  Gonorrhea and Chlamydia today  Last Pap: [id number] with biopsies at 9 and 12 o clock revealed mild dysplasia with negative ECC per Ayana Mccartney NP. Advised follow up pap in 1 year. Gardasil vaccine series initiated with last dose in 2021.  Secondary to lower right abdominal tenderness-ultrasound ordered single viable intrauterine pregnancy with crown rump length 5 weeks 6 days yielding EDC 2021.  Fetal heart tones were visualized but unable to measure.  Subchorionic hemorrhage in left corpus luteum cyst noted.  Findings discussed with patient verbalizes understanding.  Phenergan 12.5 mg order for nausea today  Continue Zoloft 50 mg PO daily   Daily prenatal vitamin prescription sent   First-trimester precautions reviewed.  Aware of Coronavirus precautions and restrictions.  New OB an ultrasound in 2 weeks or p.r.n. problems      ICD-10-CM ICD-9-CM    1. Pregnancy with inconclusive fetal viability, single or unspecified fetus  O36.80X0 V23.87 HIV 1/2 Ag/Ab (4th Gen)      RPR      Rubella Antibody, IgG      Type & Screen      CBC Auto Differential      C. trachomatis/N. gonorrhoeae by AMP DNA      Sickle Cell Screen      Hepatitis Panel, Acute      Urinalysis, Reflex to Urine Culture Urine, Clean Catch       US OB/GYN Procedure ([facility])         Patient was counseled today on proper weight gain based on the [place] [facility]'s recommendations based on her pre-pregnancy weight. Discussed foods to avoid in pregnancy (i.e. sushi, fish that are high in mercury, deli meat, and unpasteurized cheeses). Discussed prenatal vitamin options (i.e. stool softener, DHA). Contingency screen offered - patient desires.    No follow-ups on file.

## 2020-12-03 ENCOUNTER — TELEPHONE (OUTPATIENT)
Dept: OBSTETRICS AND GYNECOLOGY | Facility: CLINIC | Age: 25
End: 2020-12-03

## 2020-12-03 ENCOUNTER — PATIENT MESSAGE (OUTPATIENT)
Dept: OBSTETRICS AND GYNECOLOGY | Facility: CLINIC | Age: 25
End: 2020-12-03

## 2020-12-03 LAB
C TRACH DNA SPEC QL NAA+PROBE: NOT DETECTED
HAV IGM SERPL QL IA: NEGATIVE
HBV CORE IGM SERPL QL IA: NEGATIVE
HBV SURFACE AG SERPL QL IA: NEGATIVE
HCV AB SERPL QL IA: NEGATIVE
HIV 1+2 AB+HIV1 P24 AG SERPL QL IA: NEGATIVE
N GONORRHOEA DNA SPEC QL NAA+PROBE: NOT DETECTED
RPR SER QL: NORMAL
RUBV IGG SER-ACNC: 29.9 IU/ML
RUBV IGG SER-IMP: REACTIVE

## 2020-12-03 RX ORDER — ONDANSETRON 4 MG/1
8 TABLET, ORALLY DISINTEGRATING ORAL EVERY 8 HOURS PRN
Qty: 20 TABLET | Refills: 1 | Status: SHIPPED | OUTPATIENT
Start: 2020-12-03 | End: 2022-05-06

## 2020-12-03 RX ORDER — PROMETHAZINE HYDROCHLORIDE 25 MG/1
25 TABLET ORAL EVERY 4 HOURS
Qty: 30 TABLET | Refills: 1 | Status: SHIPPED | OUTPATIENT
Start: 2020-12-03 | End: 2022-05-06

## 2020-12-03 NOTE — TELEPHONE ENCOUNTER
----- Message from Danielle Rand sent at 12/3/2020 10:51 AM CST -----  Regarding: rx  Contact: pt  Pt was seen yesterday. Pt states only one of the two prescriptions were called out for her.   Pharmacy did not fill the nausea medication. Pt can be reached at [phone]  Pt call pt when it's called in or message through [facility].

## 2020-12-03 NOTE — TELEPHONE ENCOUNTER
The other prenatal vitamin that was sent was an over the counter Rx. Can you send this new Rx in for the patient please?

## 2020-12-03 NOTE — TELEPHONE ENCOUNTER
Patient is also requesting something be sent in for N&V.   fall precautions/oxygen therapy device and L/min/obesity precautions

## 2020-12-04 ENCOUNTER — PATIENT MESSAGE (OUTPATIENT)
Dept: OBSTETRICS AND GYNECOLOGY | Facility: CLINIC | Age: 25
End: 2020-12-04

## 2020-12-04 ENCOUNTER — TELEPHONE (OUTPATIENT)
Dept: OBSTETRICS AND GYNECOLOGY | Facility: CLINIC | Age: 25
End: 2020-12-04

## 2020-12-04 NOTE — TELEPHONE ENCOUNTER
----- Message from Fiona Jacob sent at 12/4/2020 11:55 AM CST -----  .Type:  Patient Returning Call    Who Called:BRAYDEN BRAVO   Who Left Message for Patient:Nurse  Does the patient know what this is regarding?:  Would the patient rather a call back or a response via [facility]? Call   Best Call Back Number:.[phone]   Additional Information: patient asked if someone can call her back asap, please.

## 2020-12-05 ENCOUNTER — PATIENT MESSAGE (OUTPATIENT)
Dept: OBSTETRICS AND GYNECOLOGY | Facility: CLINIC | Age: 25
End: 2020-12-05

## 2020-12-07 NOTE — TELEPHONE ENCOUNTER
Informed Pt that we do not provide  options here at [facility] and neither can I recommend places. Asked Pt if I could be of further assistance and if she has any other concerns I'd be happy to assist. Pt verbalizes understanding. DS

## 2020-12-16 ENCOUNTER — OFFICE VISIT (OUTPATIENT)
Dept: OBSTETRICS AND GYNECOLOGY | Facility: CLINIC | Age: 25
End: 2020-12-16
Payer: COMMERCIAL

## 2020-12-16 ENCOUNTER — PROCEDURE VISIT (OUTPATIENT)
Dept: OBSTETRICS AND GYNECOLOGY | Facility: CLINIC | Age: 25
End: 2020-12-16
Payer: COMMERCIAL

## 2020-12-16 VITALS
WEIGHT: 214.75 LBS | DIASTOLIC BLOOD PRESSURE: 80 MMHG | BODY MASS INDEX: 36.66 KG/M2 | SYSTOLIC BLOOD PRESSURE: 138 MMHG | HEIGHT: 64 IN

## 2020-12-16 DIAGNOSIS — Z34.00 PRIMIGRAVIDA, ANTEPARTUM: Primary | ICD-10-CM

## 2020-12-16 DIAGNOSIS — O03.4 INCOMPLETE MISCARRIAGE: ICD-10-CM

## 2020-12-16 DIAGNOSIS — O03.4 INCOMPLETE MISCARRIAGE: Primary | ICD-10-CM

## 2020-12-16 PROCEDURE — 76856 PR  ECHO,PELVIC (NONOBSTETRIC): ICD-10-PCS | Mod: S$GLB,,, | Performed by: OBSTETRICS & GYNECOLOGY

## 2020-12-16 PROCEDURE — 0502F PR SUBSEQUENT PRENATAL CARE: ICD-10-PCS | Mod: CPTII,S$GLB,, | Performed by: ADVANCED PRACTICE MIDWIFE

## 2020-12-16 PROCEDURE — 99999 PR PBB SHADOW E&M-EST. PATIENT-LVL III: ICD-10-PCS | Mod: PBBFAC,,, | Performed by: ADVANCED PRACTICE MIDWIFE

## 2020-12-16 PROCEDURE — 76856 US EXAM PELVIC COMPLETE: CPT | Mod: S$GLB,,, | Performed by: OBSTETRICS & GYNECOLOGY

## 2020-12-16 PROCEDURE — 0502F SUBSEQUENT PRENATAL CARE: CPT | Mod: CPTII,S$GLB,, | Performed by: ADVANCED PRACTICE MIDWIFE

## 2020-12-16 PROCEDURE — 99999 PR PBB SHADOW E&M-EST. PATIENT-LVL III: CPT | Mod: PBBFAC,,, | Performed by: ADVANCED PRACTICE MIDWIFE

## 2020-12-16 RX ORDER — MISOPROSTOL 200 UG/1
800 TABLET ORAL ONCE
Qty: 4 TABLET | Refills: 1 | Status: SHIPPED | OUTPATIENT
Start: 2020-12-16 | End: 2022-05-06

## 2020-12-16 RX ORDER — IBUPROFEN 800 MG/1
800 TABLET ORAL 3 TIMES DAILY
Qty: 30 TABLET | Refills: 2 | Status: SHIPPED | OUTPATIENT
Start: 2020-12-16 | End: 2020-12-23

## 2020-12-16 NOTE — PROGRESS NOTES
2020.  Ultrasound today-gestational sac not visualized, embryo not visualized.  Uterus endometrium is consistent with the presence of retained products of conception.  Endometrial thickness total 10.6 mm.  Left and right ovaries unremarkable with no free fluid visualized in the cul-de-sac  There appears to be an incomplete miscarriage and the findings are discussed thoroughly with patient, who verbalized understanding.  She states that since her last ultrasound she had some spotting but did passed some clots a week ago with some cramping and spotting since.  Denies fever, hemorrhage or severe pain.  Abdomen-slightly uncomfortable over symphysis pubis area on palpation.  No localized tenderness.  Speculum-very scant amount of blood in vaginal vault which was swabbed to clearly visualize the cervix.  Cervix was closed, no evidence of active bleeding nor during Valsalva maneuver.  Bimanual patient was very tense and so it was difficult to appreciate size of uterus there appeared to be no tenderness over the uterus or adnexa.  Discussed with Dr. Porras.  Recommends l reviewing expectant, medical and surgical management with patient.  She does favor medical management but will discuss it with partner family and we will talk about again later when she has had time to come to a conclusion that best suits her  Blood type is O positive    Cytotec and Motrin prescriptions sent with instructions            CC: Absence of menses risk assessment Katie Valle 20    Zak Wheeler is a 25 y.o. female  presents with complaint of absence of menstruation.  She reports nausea and abdominal pain.  UPT is positive.    Menstrual History  Menarche at 14 years, length 5 days, cycle 28 days  LMP 10/23/2020 , EDC 2021, therefore 6 weeks /1 day    Past Medical History:   Diagnosis Date    ADHD (attention deficit hyperactivity disorder)     Anxiety     Asthma     IBS (irritable colon syndrome)      Past Surgical  "History:   Procedure Laterality Date    TYMPANOSTOMY TUBE PLACEMENT Bilateral     age of 6 or 7      Social History     Socioeconomic History    Marital status: Single     Spouse name: Not on file    Number of children: 0    Years of education: Not on file    Highest education level: Not on file   Occupational History    Occupation: student - social work     Comment: [employer]   Social Needs    Financial resource strain: Not on file    Food insecurity     Worry: Not on file     Inability: Not on file    Transportation needs     Medical: Not on file     Non-medical: Not on file   Tobacco Use    Smoking status: Never Smoker    Smokeless tobacco: Never Used   Substance and Sexual Activity    Alcohol use: Not Currently     Comment: socially     Drug use: No    Sexual activity: Yes     Partners: Male     Birth control/protection: None   Lifestyle    Physical activity     Days per week: Not on file     Minutes per session: Not on file    Stress: Not on file   Relationships    Social connections     Talks on phone: Not on file     Gets together: Not on file     Attends Yazdanism service: Not on file     Active member of club or organization: Not on file     Attends meetings of clubs or organizations: Not on file     Relationship status: Not on file   Other Topics Concern    Not on file   Social History Narrative    Not on file     Family History   Problem Relation Age of Onset    Thyroid disease Mother     No Known Problems Father     Diabetes Paternal Grandmother     Breast cancer Neg Hx     Colon cancer Neg Hx     Uterine cancer Neg Hx     Ovarian cancer Neg Hx      OB History    Para Term  AB Living   1 0 0 0 0 0   SAB TAB Ectopic Multiple Live Births   0 0 0 0 0      # Outcome Date GA Lbr Anselmo/2nd Weight Sex Delivery Anes PTL Lv   1 Current                /80   Ht 5' 4" (1.626 m)   Wt 97.4 kg (214 lb 11.7 oz)   LMP 10/23/2020   BMI 36.86 kg/m²         ROS:  GENERAL: Denies " weight gain or weight loss. Feeling well overall.   SKIN: Denies rash or lesions.   HEAD: Denies head injury or headache.   NODES: Denies enlarged lymph nodes.   CHEST: Denies chest pain or shortness of breath. Reports difficulty taking in deep breaths.  CARDIOVASCULAR: Denies palpitations or left sided chest pain.   ABDOMEN: Reports abdominal tenderness to left lower quadrant and nausea. Denies constipation, vomiting or rectal bleeding.   URINARY: No frequency, dysuria, hematuria, or burning on urination.   REPRODUCTIVE: See HPI.   BREASTS: The patient does not perform breast self-examination. Denies pain or nipple discharge.   HEMATOLOGIC: No easy bruisability or excessive bleeding.   MUSCULOSKELETAL: Denies joint pain or swelling.   NEUROLOGIC: Denies syncope or weakness.   PSYCHIATRIC: Denies depression, anxiety or mood swings.    PE:   APPEARANCE: Well nourished, well developed, in no acute distress.  AFFECT: WNL, alert and oriented x 3.  SKIN: No acne or hirsutism.  NECK: Neck symmetric without masses or thyromegaly.  NODES: No inguinal, cervical, axillary or femoral lymph node enlargement.  CHEST: Good respiratory effort. Clear lung sounds on anterior and posterior auscultation bilaterally.   CARDIOVASCULAR: Heart rate even and regular. S1 and S2 auscultated. No murmurs or abnormal heart sounds auscultated.   ABDOMEN: Soft. Right and left lower quadrant tenderness. No masses. No hepatosplenomegaly. No hernias.  BREASTS: Symmetrical, no skin changes or visible lesions. No palpable masses, nipple discharge bilaterally.  PELVIC: Normal external female genitalia without lesions. Normal hair distribution. Adequate perineal body, normal urethral meatus. Vagina moist and well rugated without lesions or discharge. Cervix not visualized. No significant cystocele or rectocele. Bimanual exam shows uterus is 6-8 weeks, regular, mobile and nontender. Adnexa slight tenderness noted in lower right abdomen.  EXTREMITIES: No  edema.          ASSESSMENT and PLAN:  25 year old  with secondary amenorrhea and positive UPT.  LMP 10/23/2020 EDC 2021, therefore 6 weeks 1 day  Prenatal lab today  Gonorrhea and Chlamydia today  Last Pap: [id number] with biopsies at 9 and 12 o clock revealed mild dysplasia with negative ECC per Ayana Mccartney NP. Advised follow up pap in 1 year. Gardasil vaccine series initiated with last dose in 2021.  Secondary to lower right abdominal tenderness-ultrasound ordered single viable intrauterine pregnancy with crown rump length 5 weeks 6 days yielding EDC 2021.  Fetal heart tones were visualized but unable to measure.  Subchorionic hemorrhage in left corpus luteum cyst noted.  Findings discussed with patient verbalizes understanding.  Phenergan 12.5 mg order for nausea today  Continue Zoloft 50 mg PO daily   Daily prenatal vitamin prescription sent   First-trimester precautions reviewed.  Aware of Coronavirus precautions and restrictions.  New OB an ultrasound in 2 weeks or p.r.n. problems    No diagnosis found.      Patient was counseled today on proper weight gain based on the [facility] [employer] [facility]'s recommendations based on her pre-pregnancy weight. Discussed foods to avoid in pregnancy (i.e. sushi, fish that are high in mercury, deli meat, and unpasteurized cheeses). Discussed prenatal vitamin options (i.e. stool softener, DHA). Contingency screen offered - patient desires.    No follow-ups on file.

## 2020-12-16 NOTE — PATIENT INSTRUCTIONS
Understanding Miscarriage: During a Miscarriage  No two miscarriages are alike. Because of this, your healthcare provider will talk with you about the most appropriate treatment for you. If youre in good health and early in the pregnancy, your body may expel all the pregnancy tissue on its own. If your body doesnt expel all the tissue, your healthcare provider may recommend treatment to prevent infection and severe bleeding (hemorrhaging).  What happens during miscarriage  Some miscarriages happen without any signs or symptoms. Most miscarriages, however, start with bleeding and cramping, which may increase over time. The cramps may get very strong. This is normal when a miscarriage is happening. Cramping widens the passage (cervix) that tissue from the uterus must pass through to leave your body. Your healthcare provider may ask you for a sample of the tissue for lab testing. This is to make sure that the cells being shed from your body are normal.  Diagnosis  To confirm the miscarriage, your healthcare provider will do a pelvic exam. Your healthcare provider might order a blood test to measure the levels of a pregnancy hormone (hCG).  He or she may also have you get an ultrasound test to find out if all of the tissue has passed from the uterus. If a miscarriage happens very early in the pregnancy, an ultrasound is not needed.  Treatment  If any tissue remains in the uterus, your healthcare provider may suggest the following measures depending on your particular situation:  · Medicine. This is prescribed for you to take at home. The medicine causes the uterus to expel any remaining tissue. Take the medicine exactly as directed.  · Dilation and curettage (D & C). This procedure is done in your healthcare providers office or at the hospital. You are given medicine to prevent pain or to allow you to relax or sleep during the procedure. The healthcare provider uses instruments to widen the cervix (dilate). Tissue  and blood that line the uterus are then removed (curettage).  Be sure you talk to your healthcare provider about the risks and benefits of these treatments.  If you have Rh-negative blood  If your blood is Rh negative, you may need treatment with Rho(D) immune globulin. This injection prevents substances in your blood from attacking the babys blood during a future pregnancy. Your healthcare provider can tell you more.   Follow-up care  Keep all follow-up appointments. These are needed to make sure that you are healing well. During these visits, mention if youre feeling very sad or depressed. Your healthcare provider can suggest counseling or other resources to help you.  When to seek medical care  Contact your healthcare provider if you have any of the following:  · Severe pain in the stomach, pelvis, or low back  · Vaginal discharge that has a bad odor  · Bleeding that soaks a new sanitary pad each hour  · Fever of 100.4°F (38°C) or higher, or as directed by your healthcare provider   Date Last Reviewed: 6/1/2016  © 6030-3586 [employer]. [address] [place] [address], [place] [address]. All rights reserved. This information is not intended as a substitute for professional medical care. Always follow your healthcare professional's instructions.

## 2020-12-23 ENCOUNTER — TELEPHONE (OUTPATIENT)
Dept: OBSTETRICS AND GYNECOLOGY | Facility: CLINIC | Age: 25
End: 2020-12-23

## 2020-12-23 ENCOUNTER — PATIENT MESSAGE (OUTPATIENT)
Dept: OBSTETRICS AND GYNECOLOGY | Facility: CLINIC | Age: 25
End: 2020-12-23

## 2020-12-23 DIAGNOSIS — O03.9 MISCARRIAGE: Primary | ICD-10-CM

## 2020-12-23 NOTE — TELEPHONE ENCOUNTER
Called to follow up on Cytotec management planned on 12/16/2020.  Took Cytotec 2 days ago has had some intermittent spotting and menstrual like bleeding, expelling small clots and some minor cramping.  Appears to be managing but we do need beta HCG soon to check levels.  Miscarriage precautions remain active and she verbalizes understanding

## 2020-12-23 NOTE — TELEPHONE ENCOUNTER
Left a message for the patient to give us a call back at [phone] or she can respond to us via my [facility].

## 2021-01-05 ENCOUNTER — PATIENT MESSAGE (OUTPATIENT)
Dept: OBSTETRICS AND GYNECOLOGY | Facility: CLINIC | Age: 26
End: 2021-01-05

## 2021-01-05 ENCOUNTER — TELEPHONE (OUTPATIENT)
Dept: OBSTETRICS AND GYNECOLOGY | Facility: CLINIC | Age: 26
End: 2021-01-05

## 2021-01-06 ENCOUNTER — LAB VISIT (OUTPATIENT)
Dept: LAB | Facility: HOSPITAL | Age: 26
End: 2021-01-06
Attending: ADVANCED PRACTICE MIDWIFE
Payer: COMMERCIAL

## 2021-01-06 DIAGNOSIS — O03.9 MISCARRIAGE: ICD-10-CM

## 2021-01-06 PROCEDURE — 84702 CHORIONIC GONADOTROPIN TEST: CPT

## 2021-01-06 PROCEDURE — 36415 COLL VENOUS BLD VENIPUNCTURE: CPT

## 2021-01-07 LAB — HCG INTACT+B SERPL-ACNC: 66 MIU/ML

## 2021-04-29 ENCOUNTER — PATIENT MESSAGE (OUTPATIENT)
Dept: RESEARCH | Facility: HOSPITAL | Age: 26
End: 2021-04-29

## 2021-09-08 ENCOUNTER — OFFICE VISIT (OUTPATIENT)
Dept: OBSTETRICS AND GYNECOLOGY | Facility: CLINIC | Age: 26
End: 2021-09-08
Payer: MEDICAID

## 2021-09-08 ENCOUNTER — TELEPHONE (OUTPATIENT)
Dept: OBSTETRICS AND GYNECOLOGY | Facility: CLINIC | Age: 26
End: 2021-09-08

## 2021-09-08 ENCOUNTER — LAB VISIT (OUTPATIENT)
Dept: LAB | Facility: HOSPITAL | Age: 26
End: 2021-09-08
Attending: FAMILY MEDICINE
Payer: MEDICAID

## 2021-09-08 VITALS — WEIGHT: 207.69 LBS | HEIGHT: 64 IN | BODY MASS INDEX: 35.46 KG/M2

## 2021-09-08 DIAGNOSIS — Z23 NEED FOR HPV VACCINE: ICD-10-CM

## 2021-09-08 DIAGNOSIS — N87.0 DYSPLASIA OF CERVIX, LOW GRADE (CIN 1): ICD-10-CM

## 2021-09-08 DIAGNOSIS — N92.6 IRREGULAR BLEEDING: ICD-10-CM

## 2021-09-08 DIAGNOSIS — N89.8 VAGINAL ODOR: ICD-10-CM

## 2021-09-08 DIAGNOSIS — Z11.3 SCREENING EXAMINATION FOR STD (SEXUALLY TRANSMITTED DISEASE): ICD-10-CM

## 2021-09-08 DIAGNOSIS — N92.6 IRREGULAR BLEEDING: Primary | ICD-10-CM

## 2021-09-08 LAB
BASOPHILS # BLD AUTO: 0.05 K/UL (ref 0–0.2)
BASOPHILS NFR BLD: 1.3 % (ref 0–1.9)
DIFFERENTIAL METHOD: ABNORMAL
EOSINOPHIL # BLD AUTO: 0.4 K/UL (ref 0–0.5)
EOSINOPHIL NFR BLD: 10.2 % (ref 0–8)
ERYTHROCYTE [DISTWIDTH] IN BLOOD BY AUTOMATED COUNT: 13.8 % (ref 11.5–14.5)
HCT VFR BLD AUTO: 38.3 % (ref 37–48.5)
HGB BLD-MCNC: 12.3 G/DL (ref 12–16)
IMM GRANULOCYTES # BLD AUTO: 0.01 K/UL (ref 0–0.04)
IMM GRANULOCYTES NFR BLD AUTO: 0.3 % (ref 0–0.5)
LYMPHOCYTES # BLD AUTO: 1.5 K/UL (ref 1–4.8)
LYMPHOCYTES NFR BLD: 39.8 % (ref 18–48)
MCH RBC QN AUTO: 27.8 PG (ref 27–31)
MCHC RBC AUTO-ENTMCNC: 32.1 G/DL (ref 32–36)
MCV RBC AUTO: 87 FL (ref 82–98)
MONOCYTES # BLD AUTO: 0.4 K/UL (ref 0.3–1)
MONOCYTES NFR BLD: 9.4 % (ref 4–15)
NEUTROPHILS # BLD AUTO: 1.5 K/UL (ref 1.8–7.7)
NEUTROPHILS NFR BLD: 39 % (ref 38–73)
NRBC BLD-RTO: 0 /100 WBC
PLATELET # BLD AUTO: 233 K/UL (ref 150–450)
PMV BLD AUTO: 11 FL (ref 9.2–12.9)
RBC # BLD AUTO: 4.42 M/UL (ref 4–5.4)
TSH SERPL DL<=0.005 MIU/L-ACNC: 1.76 UIU/ML (ref 0.4–4)
WBC # BLD AUTO: 3.84 K/UL (ref 3.9–12.7)

## 2021-09-08 PROCEDURE — 84443 ASSAY THYROID STIM HORMONE: CPT | Performed by: NURSE PRACTITIONER

## 2021-09-08 PROCEDURE — 87624 HPV HI-RISK TYP POOLED RSLT: CPT | Performed by: NURSE PRACTITIONER

## 2021-09-08 PROCEDURE — 87481 CANDIDA DNA AMP PROBE: CPT | Mod: 59 | Performed by: NURSE PRACTITIONER

## 2021-09-08 PROCEDURE — 99213 OFFICE O/P EST LOW 20 MIN: CPT | Mod: S$PBB,,, | Performed by: NURSE PRACTITIONER

## 2021-09-08 PROCEDURE — 81025 URINE PREGNANCY TEST: CPT | Mod: PBBFAC | Performed by: NURSE PRACTITIONER

## 2021-09-08 PROCEDURE — 99213 PR OFFICE/OUTPT VISIT, EST, LEVL III, 20-29 MIN: ICD-10-PCS | Mod: S$PBB,,, | Performed by: NURSE PRACTITIONER

## 2021-09-08 PROCEDURE — 99999 PR PBB SHADOW E&M-EST. PATIENT-LVL III: CPT | Mod: PBBFAC,,, | Performed by: NURSE PRACTITIONER

## 2021-09-08 PROCEDURE — 85025 COMPLETE CBC W/AUTO DIFF WBC: CPT | Performed by: NURSE PRACTITIONER

## 2021-09-08 PROCEDURE — 87591 N.GONORRHOEAE DNA AMP PROB: CPT | Mod: 59 | Performed by: NURSE PRACTITIONER

## 2021-09-08 PROCEDURE — 99213 OFFICE O/P EST LOW 20 MIN: CPT | Mod: PBBFAC | Performed by: NURSE PRACTITIONER

## 2021-09-08 PROCEDURE — 36415 COLL VENOUS BLD VENIPUNCTURE: CPT | Performed by: NURSE PRACTITIONER

## 2021-09-08 PROCEDURE — 87389 HIV-1 AG W/HIV-1&-2 AB AG IA: CPT | Performed by: NURSE PRACTITIONER

## 2021-09-08 PROCEDURE — 88175 CYTOPATH C/V AUTO FLUID REDO: CPT | Performed by: NURSE PRACTITIONER

## 2021-09-08 PROCEDURE — 86592 SYPHILIS TEST NON-TREP QUAL: CPT | Performed by: NURSE PRACTITIONER

## 2021-09-08 PROCEDURE — 99999 PR PBB SHADOW E&M-EST. PATIENT-LVL III: ICD-10-PCS | Mod: PBBFAC,,, | Performed by: NURSE PRACTITIONER

## 2021-09-08 PROCEDURE — 87491 CHLMYD TRACH DNA AMP PROBE: CPT | Mod: 59 | Performed by: NURSE PRACTITIONER

## 2021-09-08 PROCEDURE — 90471 IMMUNIZATION ADMIN: CPT | Mod: PBBFAC

## 2021-09-09 ENCOUNTER — HOSPITAL ENCOUNTER (OUTPATIENT)
Dept: RADIOLOGY | Facility: HOSPITAL | Age: 26
Discharge: HOME | End: 2021-09-09
Attending: NURSE PRACTITIONER
Payer: MEDICAID

## 2021-09-09 ENCOUNTER — PATIENT MESSAGE (OUTPATIENT)
Dept: OBSTETRICS AND GYNECOLOGY | Facility: CLINIC | Age: 26
End: 2021-09-09

## 2021-09-09 DIAGNOSIS — N92.6 IRREGULAR BLEEDING: ICD-10-CM

## 2021-09-09 LAB
BACTERIAL VAGINOSIS DNA: POSITIVE
C TRACH DNA SPEC QL NAA+PROBE: NOT DETECTED
CANDIDA GLABRATA DNA: NEGATIVE
CANDIDA KRUSEI DNA: NEGATIVE
CANDIDA RRNA VAG QL PROBE: NEGATIVE
HIV 1+2 AB+HIV1 P24 AG SERPL QL IA: NEGATIVE
N GONORRHOEA DNA SPEC QL NAA+PROBE: NOT DETECTED
RPR SER QL: NORMAL
T VAGINALIS RRNA GENITAL QL PROBE: NEGATIVE

## 2021-09-09 PROCEDURE — 76856 US PELVIS COMP WITH TRANSVAG NON-OB (XPD): ICD-10-PCS | Mod: 26,,, | Performed by: RADIOLOGY

## 2021-09-09 PROCEDURE — 76856 US EXAM PELVIC COMPLETE: CPT | Mod: TC

## 2021-09-09 PROCEDURE — 76830 US PELVIS COMP WITH TRANSVAG NON-OB (XPD): ICD-10-PCS | Mod: 26,,, | Performed by: RADIOLOGY

## 2021-09-09 PROCEDURE — 76856 US EXAM PELVIC COMPLETE: CPT | Mod: 26,,, | Performed by: RADIOLOGY

## 2021-09-09 PROCEDURE — 76830 TRANSVAGINAL US NON-OB: CPT | Mod: 26,,, | Performed by: RADIOLOGY

## 2021-09-10 DIAGNOSIS — B96.89 BACTERIAL VAGINOSIS: Primary | ICD-10-CM

## 2021-09-10 DIAGNOSIS — N76.0 BACTERIAL VAGINOSIS: Primary | ICD-10-CM

## 2021-09-10 RX ORDER — METRONIDAZOLE 500 MG/1
500 TABLET ORAL EVERY 12 HOURS
Qty: 14 TABLET | Refills: 0 | Status: SHIPPED | OUTPATIENT
Start: 2021-09-10 | End: 2021-09-17

## 2021-09-14 LAB
CLINICAL INFO: NORMAL
CYTO CVX: NORMAL
CYTOLOGIST CVX/VAG CYTO: NORMAL
CYTOLOGIST CVX/VAG CYTO: NORMAL
CYTOLOGY CMNT CVX/VAG CYTO-IMP: NORMAL
CYTOLOGY PAP THIN PREP EXPLANATION: NORMAL
DATE OF PREVIOUS PAP: NO
DATE PREVIOUS BX: NO
GEN CATEG CVX/VAG CYTO-IMP: NORMAL
HPV I/H RISK 4 DNA CVX QL NAA+PROBE: NOT DETECTED
LMP START DATE: NORMAL
MICROORGANISM CVX/VAG CYTO: NORMAL
PATHOLOGIST CVX/VAG CYTO: NORMAL
SERVICE CMNT-IMP: NORMAL
SPECIMEN SOURCE CVX/VAG CYTO: NORMAL
STAT OF ADQ CVX/VAG CYTO-IMP: NORMAL

## 2021-09-26 ENCOUNTER — PATIENT MESSAGE (OUTPATIENT)
Dept: OBSTETRICS AND GYNECOLOGY | Facility: CLINIC | Age: 26
End: 2021-09-26

## 2021-09-28 DIAGNOSIS — N89.8 VAGINAL ITCHING: Primary | ICD-10-CM

## 2021-09-28 DIAGNOSIS — R39.9 UTI SYMPTOMS: Primary | ICD-10-CM

## 2021-09-28 RX ORDER — FLUCONAZOLE 150 MG/1
150 TABLET ORAL ONCE
Qty: 1 TABLET | Refills: 0 | Status: SHIPPED | OUTPATIENT
Start: 2021-09-28 | End: 2021-09-28

## 2021-10-05 ENCOUNTER — LAB VISIT (OUTPATIENT)
Dept: LAB | Facility: HOSPITAL | Age: 26
End: 2021-10-05
Attending: NURSE PRACTITIONER
Payer: MEDICAID

## 2021-10-05 DIAGNOSIS — R39.9 UTI SYMPTOMS: ICD-10-CM

## 2021-10-05 PROCEDURE — 87086 URINE CULTURE/COLONY COUNT: CPT | Performed by: NURSE PRACTITIONER

## 2021-10-07 DIAGNOSIS — R39.9 UTI SYMPTOMS: Primary | ICD-10-CM

## 2021-10-07 LAB
BACTERIA UR CULT: NORMAL
BACTERIA UR CULT: NORMAL

## 2021-10-07 RX ORDER — SULFAMETHOXAZOLE AND TRIMETHOPRIM 400; 80 MG/1; MG/1
1 TABLET ORAL 2 TIMES DAILY
Qty: 6 TABLET | Refills: 0 | Status: SHIPPED | OUTPATIENT
Start: 2021-10-07 | End: 2021-10-10

## 2021-10-14 ENCOUNTER — PATIENT MESSAGE (OUTPATIENT)
Dept: OBSTETRICS AND GYNECOLOGY | Facility: CLINIC | Age: 26
End: 2021-10-14
Payer: MEDICAID

## 2021-10-15 DIAGNOSIS — N89.8 VAGINAL ITCHING: Primary | ICD-10-CM

## 2021-10-15 RX ORDER — FLUCONAZOLE 150 MG/1
TABLET ORAL
Qty: 2 TABLET | Refills: 0 | Status: SHIPPED | OUTPATIENT
Start: 2021-10-15 | End: 2022-05-06

## 2022-05-06 ENCOUNTER — LAB VISIT (OUTPATIENT)
Dept: LAB | Facility: HOSPITAL | Age: 27
End: 2022-05-06
Attending: NURSE PRACTITIONER
Payer: MEDICAID

## 2022-05-06 ENCOUNTER — OFFICE VISIT (OUTPATIENT)
Dept: OBSTETRICS AND GYNECOLOGY | Facility: CLINIC | Age: 27
End: 2022-05-06
Payer: MEDICAID

## 2022-05-06 VITALS
BODY MASS INDEX: 40.49 KG/M2 | WEIGHT: 237.19 LBS | SYSTOLIC BLOOD PRESSURE: 112 MMHG | DIASTOLIC BLOOD PRESSURE: 78 MMHG | HEIGHT: 64 IN

## 2022-05-06 DIAGNOSIS — N92.6 IRREGULAR MENSTRUAL CYCLE: Primary | ICD-10-CM

## 2022-05-06 DIAGNOSIS — Z13.1 SCREENING FOR DIABETES MELLITUS: ICD-10-CM

## 2022-05-06 DIAGNOSIS — N89.8 VAGINAL DISCHARGE: ICD-10-CM

## 2022-05-06 DIAGNOSIS — R39.9 UTI SYMPTOMS: ICD-10-CM

## 2022-05-06 DIAGNOSIS — N92.6 IRREGULAR MENSTRUAL CYCLE: ICD-10-CM

## 2022-05-06 LAB
BASOPHILS # BLD AUTO: 0.04 K/UL (ref 0–0.2)
BASOPHILS NFR BLD: 0.7 % (ref 0–1.9)
DIFFERENTIAL METHOD: ABNORMAL
EOSINOPHIL # BLD AUTO: 0.5 K/UL (ref 0–0.5)
EOSINOPHIL NFR BLD: 9.7 % (ref 0–8)
ERYTHROCYTE [DISTWIDTH] IN BLOOD BY AUTOMATED COUNT: 12.9 % (ref 11.5–14.5)
ESTIMATED AVG GLUCOSE: 108 MG/DL (ref 68–131)
HBA1C MFR BLD: 5.4 % (ref 4–5.6)
HCT VFR BLD AUTO: 39.1 % (ref 37–48.5)
HGB BLD-MCNC: 11.7 G/DL (ref 12–16)
IMM GRANULOCYTES # BLD AUTO: 0.02 K/UL (ref 0–0.04)
IMM GRANULOCYTES NFR BLD AUTO: 0.4 % (ref 0–0.5)
LYMPHOCYTES # BLD AUTO: 1.8 K/UL (ref 1–4.8)
LYMPHOCYTES NFR BLD: 32.6 % (ref 18–48)
MCH RBC QN AUTO: 26.5 PG (ref 27–31)
MCHC RBC AUTO-ENTMCNC: 29.9 G/DL (ref 32–36)
MCV RBC AUTO: 89 FL (ref 82–98)
MONOCYTES # BLD AUTO: 0.4 K/UL (ref 0.3–1)
MONOCYTES NFR BLD: 6.9 % (ref 4–15)
NEUTROPHILS # BLD AUTO: 2.7 K/UL (ref 1.8–7.7)
NEUTROPHILS NFR BLD: 49.7 % (ref 38–73)
NRBC BLD-RTO: 0 /100 WBC
PLATELET # BLD AUTO: 323 K/UL (ref 150–450)
PMV BLD AUTO: 10.4 FL (ref 9.2–12.9)
RBC # BLD AUTO: 4.41 M/UL (ref 4–5.4)
TSH SERPL DL<=0.005 MIU/L-ACNC: 3.51 UIU/ML (ref 0.4–4)
WBC # BLD AUTO: 5.37 K/UL (ref 3.9–12.7)

## 2022-05-06 PROCEDURE — 99214 OFFICE O/P EST MOD 30 MIN: CPT | Mod: S$PBB,,, | Performed by: NURSE PRACTITIONER

## 2022-05-06 PROCEDURE — 87086 URINE CULTURE/COLONY COUNT: CPT | Performed by: NURSE PRACTITIONER

## 2022-05-06 PROCEDURE — 85025 COMPLETE CBC W/AUTO DIFF WBC: CPT | Performed by: NURSE PRACTITIONER

## 2022-05-06 PROCEDURE — 99999 PR PBB SHADOW E&M-EST. PATIENT-LVL III: ICD-10-PCS | Mod: PBBFAC,,, | Performed by: NURSE PRACTITIONER

## 2022-05-06 PROCEDURE — 3074F SYST BP LT 130 MM HG: CPT | Mod: CPTII,,, | Performed by: NURSE PRACTITIONER

## 2022-05-06 PROCEDURE — 99999 PR PBB SHADOW E&M-EST. PATIENT-LVL III: CPT | Mod: PBBFAC,,, | Performed by: NURSE PRACTITIONER

## 2022-05-06 PROCEDURE — 36415 COLL VENOUS BLD VENIPUNCTURE: CPT | Performed by: NURSE PRACTITIONER

## 2022-05-06 PROCEDURE — 1160F PR REVIEW ALL MEDS BY PRESCRIBER/CLIN PHARMACIST DOCUMENTED: ICD-10-PCS | Mod: CPTII,,, | Performed by: NURSE PRACTITIONER

## 2022-05-06 PROCEDURE — 84443 ASSAY THYROID STIM HORMONE: CPT | Performed by: NURSE PRACTITIONER

## 2022-05-06 PROCEDURE — 1160F RVW MEDS BY RX/DR IN RCRD: CPT | Mod: CPTII,,, | Performed by: NURSE PRACTITIONER

## 2022-05-06 PROCEDURE — 87801 DETECT AGNT MULT DNA AMPLI: CPT | Performed by: NURSE PRACTITIONER

## 2022-05-06 PROCEDURE — 1159F PR MEDICATION LIST DOCUMENTED IN MEDICAL RECORD: ICD-10-PCS | Mod: CPTII,,, | Performed by: NURSE PRACTITIONER

## 2022-05-06 PROCEDURE — 99214 PR OFFICE/OUTPT VISIT, EST, LEVL IV, 30-39 MIN: ICD-10-PCS | Mod: S$PBB,,, | Performed by: NURSE PRACTITIONER

## 2022-05-06 PROCEDURE — 3078F DIAST BP <80 MM HG: CPT | Mod: CPTII,,, | Performed by: NURSE PRACTITIONER

## 2022-05-06 PROCEDURE — 3008F BODY MASS INDEX DOCD: CPT | Mod: CPTII,,, | Performed by: NURSE PRACTITIONER

## 2022-05-06 PROCEDURE — 87491 CHLMYD TRACH DNA AMP PROBE: CPT | Performed by: NURSE PRACTITIONER

## 2022-05-06 PROCEDURE — 83036 HEMOGLOBIN GLYCOSYLATED A1C: CPT | Performed by: NURSE PRACTITIONER

## 2022-05-06 PROCEDURE — 3078F PR MOST RECENT DIASTOLIC BLOOD PRESSURE < 80 MM HG: ICD-10-PCS | Mod: CPTII,,, | Performed by: NURSE PRACTITIONER

## 2022-05-06 PROCEDURE — 87481 CANDIDA DNA AMP PROBE: CPT | Mod: 59 | Performed by: NURSE PRACTITIONER

## 2022-05-06 PROCEDURE — 3074F PR MOST RECENT SYSTOLIC BLOOD PRESSURE < 130 MM HG: ICD-10-PCS | Mod: CPTII,,, | Performed by: NURSE PRACTITIONER

## 2022-05-06 PROCEDURE — 3008F PR BODY MASS INDEX (BMI) DOCUMENTED: ICD-10-PCS | Mod: CPTII,,, | Performed by: NURSE PRACTITIONER

## 2022-05-06 PROCEDURE — 87591 N.GONORRHOEAE DNA AMP PROB: CPT | Performed by: NURSE PRACTITIONER

## 2022-05-06 PROCEDURE — 1159F MED LIST DOCD IN RCRD: CPT | Mod: CPTII,,, | Performed by: NURSE PRACTITIONER

## 2022-05-06 PROCEDURE — 99213 OFFICE O/P EST LOW 20 MIN: CPT | Mod: PBBFAC | Performed by: NURSE PRACTITIONER

## 2022-05-06 RX ORDER — MEDROXYPROGESTERONE ACETATE 10 MG/1
10 TABLET ORAL DAILY
Qty: 10 TABLET | Refills: 0 | Status: SHIPPED | OUTPATIENT
Start: 2022-05-06 | End: 2022-07-27

## 2022-05-06 NOTE — PROGRESS NOTES
Chief Complaint   Patient presents with    Vaginal Discharge     With odor         Zak Wheeler is a 27 y.o. female    Cycles have been regular for last 4 months lasting 4 days, however April cycle began on the  and ended 4 days later and then she started spotting and continues to spot today.   Declines hormonal birth control seeking pregnancy.    Complains of frequency and urgency times 2 days  Complains of vaginal odor unsure if its blood or discharge     Past Medical History:   Diagnosis Date    ADHD (attention deficit hyperactivity disorder)     Anxiety     Asthma     IBS (irritable colon syndrome)      Past Surgical History:   Procedure Laterality Date    TYMPANOSTOMY TUBE PLACEMENT Bilateral     age of 6 or 7      Social History     Tobacco Use    Smoking status: Never Smoker    Smokeless tobacco: Never Used   Substance Use Topics    Alcohol use: Not Currently     Comment: socially     Drug use: No     Family History   Problem Relation Age of Onset    Thyroid disease Mother     No Known Problems Father     Diabetes Paternal Grandmother     Breast cancer Neg Hx     Colon cancer Neg Hx     Uterine cancer Neg Hx     Ovarian cancer Neg Hx      OB History    Para Term  AB Living   1 0 0 0 0 0   SAB IAB Ectopic Multiple Live Births   0 0 0 0 0      # Outcome Date GA Lbr Anselmo/2nd Weight Sex Delivery Anes PTL Lv   1                 Medication List with Changes/Refills   New Medications    MEDROXYPROGESTERONE (PROVERA) 10 MG TABLET    Take 1 tablet (10 mg total) by mouth once daily.   Current Medications    DEXTROAMPHETAMINE-AMPHETAMINE 30 MG TAB    Take 30 mg by mouth 2 (two) times a day.     ONDANSETRON (ZOFRAN-ODT) 4 MG TBDL    Take 2 tablets (8 mg total) by mouth every 8 (eight) hours as needed.   Discontinued Medications    FLUCONAZOLE (DIFLUCAN) 150 MG TAB    Take one tablet today and may repeat in three days    MISOPROSTOL (CYTOTEC) 200 MCG TAB    Place 4  "tablets (800 mcg total) vaginally once. for 1 dose    PNV-IRON-FOLIC-DOCUSATE-OM3S ( PRENATAL DHA ONE,DSS,) 27-1- MG    Take 1 capsule by mouth once daily.    PRENATAL VIT-IRON FUM-FOLIC AC (RIGHT STEP PRENATAL VITAMINS) 27 MG IRON- 0.8 MG TAB    Take 1 tablet by mouth once daily.    PROMETHAZINE (PHENERGAN) 25 MG TABLET    Take 1 tablet (25 mg total) by mouth every 4 (four) hours.    SERTRALINE (ZOLOFT) 50 MG TABLET    Take 1 tablet (50 mg total) by mouth once daily.      /78   Ht 5' 4" (1.626 m)   Wt 107.6 kg (237 lb 3.4 oz)   LMP 04/20/2022   Breastfeeding No   BMI 40.72 kg/m²     ROS:  Review of Systems      PHYSICAL EXAM:  Physical Exam  Genitourinary:      Vulva exam comments: Normal .      Inguinal canal exam comments: Normal .      Vaginal bleeding present.      Vaginal exam comments: Normal .      Adnexa exam comments: Normal .      Cervical exam comments: Normal .      Uterus exam comments: Normal .             ASSESSMENT and PLAN:    ICD-10-CM ICD-9-CM    1. Irregular menstrual cycle  N92.6 626.4 CBC Auto Differential      TSH      POCT Urine Pregnancy      C. trachomatis/N. gonorrhoeae by AMP DNA [facility]; Vagina      medroxyPROGESTERone (PROVERA) 10 MG tablet   2. Screening for diabetes mellitus  Z13.1 V77.1 Hemoglobin A1C   3. UTI symptoms  R39.9 788.99 Urine culture   4. Vaginal discharge  N89.8 623.5 Vaginosis Screen by DNA Probe       Ayana Mccartney NP     "

## 2022-05-08 LAB — BACTERIA UR CULT: NO GROWTH

## 2022-05-09 LAB
C TRACH DNA SPEC QL NAA+PROBE: NOT DETECTED
N GONORRHOEA DNA SPEC QL NAA+PROBE: NOT DETECTED

## 2022-05-12 ENCOUNTER — PATIENT MESSAGE (OUTPATIENT)
Dept: OBSTETRICS AND GYNECOLOGY | Facility: CLINIC | Age: 27
End: 2022-05-12
Payer: MEDICAID

## 2022-05-12 DIAGNOSIS — B96.89 BACTERIAL VAGINOSIS: Primary | ICD-10-CM

## 2022-05-12 DIAGNOSIS — N76.0 BACTERIAL VAGINOSIS: Primary | ICD-10-CM

## 2022-05-12 LAB
BACTERIAL VAGINOSIS DNA: POSITIVE
CANDIDA GLABRATA DNA: NEGATIVE
CANDIDA KRUSEI DNA: NEGATIVE
CANDIDA RRNA VAG QL PROBE: NEGATIVE
T VAGINALIS RRNA GENITAL QL PROBE: NEGATIVE

## 2022-05-12 RX ORDER — METRONIDAZOLE 500 MG/1
500 TABLET ORAL EVERY 12 HOURS
Qty: 14 TABLET | Refills: 0 | Status: SHIPPED | OUTPATIENT
Start: 2022-05-12 | End: 2022-05-19

## 2022-05-16 DIAGNOSIS — N89.8 VAGINAL ITCHING: Primary | ICD-10-CM

## 2022-05-16 RX ORDER — FLUCONAZOLE 150 MG/1
TABLET ORAL
Qty: 3 TABLET | Refills: 0 | Status: SHIPPED | OUTPATIENT
Start: 2022-05-16 | End: 2022-07-27

## 2022-07-26 ENCOUNTER — OFFICE VISIT (OUTPATIENT)
Dept: OBSTETRICS AND GYNECOLOGY | Facility: CLINIC | Age: 27
End: 2022-07-26
Payer: MEDICAID

## 2022-07-26 VITALS
SYSTOLIC BLOOD PRESSURE: 116 MMHG | DIASTOLIC BLOOD PRESSURE: 86 MMHG | BODY MASS INDEX: 41.33 KG/M2 | HEIGHT: 64 IN | WEIGHT: 242.06 LBS

## 2022-07-26 DIAGNOSIS — L30.9 DERMATITIS: Primary | ICD-10-CM

## 2022-07-26 PROCEDURE — 3044F PR MOST RECENT HEMOGLOBIN A1C LEVEL <7.0%: ICD-10-PCS | Mod: CPTII,,, | Performed by: NURSE PRACTITIONER

## 2022-07-26 PROCEDURE — 3074F PR MOST RECENT SYSTOLIC BLOOD PRESSURE < 130 MM HG: ICD-10-PCS | Mod: CPTII,,, | Performed by: NURSE PRACTITIONER

## 2022-07-26 PROCEDURE — 3079F DIAST BP 80-89 MM HG: CPT | Mod: CPTII,,, | Performed by: NURSE PRACTITIONER

## 2022-07-26 PROCEDURE — 99999 PR PBB SHADOW E&M-EST. PATIENT-LVL III: ICD-10-PCS | Mod: PBBFAC,,, | Performed by: NURSE PRACTITIONER

## 2022-07-26 PROCEDURE — 3074F SYST BP LT 130 MM HG: CPT | Mod: CPTII,,, | Performed by: NURSE PRACTITIONER

## 2022-07-26 PROCEDURE — 3079F PR MOST RECENT DIASTOLIC BLOOD PRESSURE 80-89 MM HG: ICD-10-PCS | Mod: CPTII,,, | Performed by: NURSE PRACTITIONER

## 2022-07-26 PROCEDURE — 99213 OFFICE O/P EST LOW 20 MIN: CPT | Mod: PBBFAC | Performed by: NURSE PRACTITIONER

## 2022-07-26 PROCEDURE — 1159F MED LIST DOCD IN RCRD: CPT | Mod: CPTII,,, | Performed by: NURSE PRACTITIONER

## 2022-07-26 PROCEDURE — 99213 OFFICE O/P EST LOW 20 MIN: CPT | Mod: S$PBB,,, | Performed by: NURSE PRACTITIONER

## 2022-07-26 PROCEDURE — 3008F BODY MASS INDEX DOCD: CPT | Mod: CPTII,,, | Performed by: NURSE PRACTITIONER

## 2022-07-26 PROCEDURE — 1159F PR MEDICATION LIST DOCUMENTED IN MEDICAL RECORD: ICD-10-PCS | Mod: CPTII,,, | Performed by: NURSE PRACTITIONER

## 2022-07-26 PROCEDURE — 99999 PR PBB SHADOW E&M-EST. PATIENT-LVL III: CPT | Mod: PBBFAC,,, | Performed by: NURSE PRACTITIONER

## 2022-07-26 PROCEDURE — 1160F PR REVIEW ALL MEDS BY PRESCRIBER/CLIN PHARMACIST DOCUMENTED: ICD-10-PCS | Mod: CPTII,,, | Performed by: NURSE PRACTITIONER

## 2022-07-26 PROCEDURE — 3044F HG A1C LEVEL LT 7.0%: CPT | Mod: CPTII,,, | Performed by: NURSE PRACTITIONER

## 2022-07-26 PROCEDURE — 3008F PR BODY MASS INDEX (BMI) DOCUMENTED: ICD-10-PCS | Mod: CPTII,,, | Performed by: NURSE PRACTITIONER

## 2022-07-26 PROCEDURE — 99213 PR OFFICE/OUTPT VISIT, EST, LEVL III, 20-29 MIN: ICD-10-PCS | Mod: S$PBB,,, | Performed by: NURSE PRACTITIONER

## 2022-07-26 PROCEDURE — 1160F RVW MEDS BY RX/DR IN RCRD: CPT | Mod: CPTII,,, | Performed by: NURSE PRACTITIONER

## 2022-07-26 RX ORDER — CLOTRIMAZOLE AND BETAMETHASONE DIPROPIONATE 10; .64 MG/G; MG/G
CREAM TOPICAL
Qty: 45 G | Refills: 1 | Status: SHIPPED | OUTPATIENT
Start: 2022-07-26 | End: 2022-11-08

## 2022-07-26 NOTE — PROGRESS NOTES
Subjective:       Patient ID: Zak Wheeler is a 27 y.o. female.    Chief Complaint:  skin peeling (Patient in with a complaint of skin peeling in her vaginal and anal area that she noticed a couple of months ago but now it's starting to get worse. )    Patient's last menstrual period was 2022.  History of Present Illness    Presents today with complaints of dry patches to bilateral groin area times 5 or 6 months.  She has tried treating the area with lotion and vaseline with no improvement.  Desires evaluation and treatment   OB History    Para Term  AB Living   1 0 0 0 0 0   SAB IAB Ectopic Multiple Live Births   0 0 0 0 0      # Outcome Date GA Lbr Anselmo/2nd Weight Sex Delivery Anes PTL Lv   1                 Review of Systems  Review of Systems   Skin: Positive for rash.           Objective:    Physical Exam  Skin:     General: Skin is warm and dry.      Findings: Rash present. Rash is scaling.                  Assessment:     1. Dermatitis              Plan:   Zak was seen today for skin peeling.    Diagnoses and all orders for this visit:    Dermatitis  -     clotrimazole-betamethasone 1-0.05% (LOTRISONE) cream; Apply to affected area 2 times daily

## 2022-09-09 ENCOUNTER — OFFICE VISIT (OUTPATIENT)
Dept: OBSTETRICS AND GYNECOLOGY | Facility: CLINIC | Age: 27
End: 2022-09-09
Payer: MEDICAID

## 2022-09-09 ENCOUNTER — LAB VISIT (OUTPATIENT)
Dept: LAB | Facility: HOSPITAL | Age: 27
End: 2022-09-09
Attending: ADVANCED PRACTICE MIDWIFE
Payer: MEDICAID

## 2022-09-09 VITALS
DIASTOLIC BLOOD PRESSURE: 84 MMHG | SYSTOLIC BLOOD PRESSURE: 124 MMHG | WEIGHT: 234.13 LBS | BODY MASS INDEX: 39.97 KG/M2 | HEIGHT: 64 IN

## 2022-09-09 DIAGNOSIS — O20.0 THREATENED MISCARRIAGE IN EARLY PREGNANCY: ICD-10-CM

## 2022-09-09 DIAGNOSIS — O20.0 THREATENED MISCARRIAGE IN EARLY PREGNANCY: Primary | ICD-10-CM

## 2022-09-09 LAB
ABO + RH BLD: NORMAL
BASOPHILS # BLD AUTO: 0.04 K/UL (ref 0–0.2)
BASOPHILS NFR BLD: 0.7 % (ref 0–1.9)
BLD GP AB SCN CELLS X3 SERPL QL: NORMAL
DIFFERENTIAL METHOD: ABNORMAL
EOSINOPHIL # BLD AUTO: 0.3 K/UL (ref 0–0.5)
EOSINOPHIL NFR BLD: 5.5 % (ref 0–8)
ERYTHROCYTE [DISTWIDTH] IN BLOOD BY AUTOMATED COUNT: 14.9 % (ref 11.5–14.5)
HCG INTACT+B SERPL-ACNC: 135 MIU/ML
HCT VFR BLD AUTO: 36.3 % (ref 37–48.5)
HGB BLD-MCNC: 11.7 G/DL (ref 12–16)
IMM GRANULOCYTES # BLD AUTO: 0.02 K/UL (ref 0–0.04)
IMM GRANULOCYTES NFR BLD AUTO: 0.3 % (ref 0–0.5)
LYMPHOCYTES # BLD AUTO: 2.3 K/UL (ref 1–4.8)
LYMPHOCYTES NFR BLD: 37.7 % (ref 18–48)
MCH RBC QN AUTO: 27.1 PG (ref 27–31)
MCHC RBC AUTO-ENTMCNC: 32.2 G/DL (ref 32–36)
MCV RBC AUTO: 84 FL (ref 82–98)
MONOCYTES # BLD AUTO: 0.6 K/UL (ref 0.3–1)
MONOCYTES NFR BLD: 9.7 % (ref 4–15)
NEUTROPHILS # BLD AUTO: 2.8 K/UL (ref 1.8–7.7)
NEUTROPHILS NFR BLD: 46.1 % (ref 38–73)
NRBC BLD-RTO: 0 /100 WBC
PLATELET # BLD AUTO: 261 K/UL (ref 150–450)
PMV BLD AUTO: 10.4 FL (ref 9.2–12.9)
RBC # BLD AUTO: 4.32 M/UL (ref 4–5.4)
WBC # BLD AUTO: 6 K/UL (ref 3.9–12.7)

## 2022-09-09 PROCEDURE — 99999 PR PBB SHADOW E&M-EST. PATIENT-LVL II: ICD-10-PCS | Mod: PBBFAC,,, | Performed by: ADVANCED PRACTICE MIDWIFE

## 2022-09-09 PROCEDURE — 3074F PR MOST RECENT SYSTOLIC BLOOD PRESSURE < 130 MM HG: ICD-10-PCS | Mod: CPTII,,, | Performed by: ADVANCED PRACTICE MIDWIFE

## 2022-09-09 PROCEDURE — 86901 BLOOD TYPING SEROLOGIC RH(D): CPT | Performed by: ADVANCED PRACTICE MIDWIFE

## 2022-09-09 PROCEDURE — 99999 PR PBB SHADOW E&M-EST. PATIENT-LVL II: CPT | Mod: PBBFAC,,, | Performed by: ADVANCED PRACTICE MIDWIFE

## 2022-09-09 PROCEDURE — 3044F PR MOST RECENT HEMOGLOBIN A1C LEVEL <7.0%: ICD-10-PCS | Mod: CPTII,,, | Performed by: ADVANCED PRACTICE MIDWIFE

## 2022-09-09 PROCEDURE — 84702 CHORIONIC GONADOTROPIN TEST: CPT | Performed by: ADVANCED PRACTICE MIDWIFE

## 2022-09-09 PROCEDURE — 3008F BODY MASS INDEX DOCD: CPT | Mod: CPTII,,, | Performed by: ADVANCED PRACTICE MIDWIFE

## 2022-09-09 PROCEDURE — 3079F PR MOST RECENT DIASTOLIC BLOOD PRESSURE 80-89 MM HG: ICD-10-PCS | Mod: CPTII,,, | Performed by: ADVANCED PRACTICE MIDWIFE

## 2022-09-09 PROCEDURE — 99213 OFFICE O/P EST LOW 20 MIN: CPT | Mod: S$PBB,TH,, | Performed by: ADVANCED PRACTICE MIDWIFE

## 2022-09-09 PROCEDURE — 3008F PR BODY MASS INDEX (BMI) DOCUMENTED: ICD-10-PCS | Mod: CPTII,,, | Performed by: ADVANCED PRACTICE MIDWIFE

## 2022-09-09 PROCEDURE — 3074F SYST BP LT 130 MM HG: CPT | Mod: CPTII,,, | Performed by: ADVANCED PRACTICE MIDWIFE

## 2022-09-09 PROCEDURE — 1159F PR MEDICATION LIST DOCUMENTED IN MEDICAL RECORD: ICD-10-PCS | Mod: CPTII,,, | Performed by: ADVANCED PRACTICE MIDWIFE

## 2022-09-09 PROCEDURE — 85025 COMPLETE CBC W/AUTO DIFF WBC: CPT | Performed by: ADVANCED PRACTICE MIDWIFE

## 2022-09-09 PROCEDURE — 36415 COLL VENOUS BLD VENIPUNCTURE: CPT | Performed by: ADVANCED PRACTICE MIDWIFE

## 2022-09-09 PROCEDURE — 3044F HG A1C LEVEL LT 7.0%: CPT | Mod: CPTII,,, | Performed by: ADVANCED PRACTICE MIDWIFE

## 2022-09-09 PROCEDURE — 3079F DIAST BP 80-89 MM HG: CPT | Mod: CPTII,,, | Performed by: ADVANCED PRACTICE MIDWIFE

## 2022-09-09 PROCEDURE — 99212 OFFICE O/P EST SF 10 MIN: CPT | Mod: PBBFAC,TH | Performed by: ADVANCED PRACTICE MIDWIFE

## 2022-09-09 PROCEDURE — 99213 PR OFFICE/OUTPT VISIT, EST, LEVL III, 20-29 MIN: ICD-10-PCS | Mod: S$PBB,TH,, | Performed by: ADVANCED PRACTICE MIDWIFE

## 2022-09-09 PROCEDURE — 1159F MED LIST DOCD IN RCRD: CPT | Mod: CPTII,,, | Performed by: ADVANCED PRACTICE MIDWIFE

## 2022-09-12 ENCOUNTER — LAB VISIT (OUTPATIENT)
Dept: LAB | Facility: HOSPITAL | Age: 27
End: 2022-09-12
Attending: ADVANCED PRACTICE MIDWIFE
Payer: MEDICAID

## 2022-09-12 DIAGNOSIS — O20.0 THREATENED MISCARRIAGE IN EARLY PREGNANCY: ICD-10-CM

## 2022-09-12 LAB — HCG INTACT+B SERPL-ACNC: 64 MIU/ML

## 2022-09-12 PROCEDURE — 36415 COLL VENOUS BLD VENIPUNCTURE: CPT | Performed by: ADVANCED PRACTICE MIDWIFE

## 2022-09-12 PROCEDURE — 84702 CHORIONIC GONADOTROPIN TEST: CPT | Performed by: ADVANCED PRACTICE MIDWIFE

## 2022-09-19 ENCOUNTER — TELEPHONE (OUTPATIENT)
Dept: OBSTETRICS AND GYNECOLOGY | Facility: CLINIC | Age: 27
End: 2022-09-19
Payer: MEDICAID

## 2022-09-19 NOTE — TELEPHONE ENCOUNTER
"Pt called stating she was returning a call she received Wednesday September, 14,2022. I didn't see a telephone encounter to advise pt on what the phone call was regarding. Pt ask for another call back from "[facility]".  "

## 2022-09-29 ENCOUNTER — PATIENT MESSAGE (OUTPATIENT)
Dept: OBSTETRICS AND GYNECOLOGY | Facility: CLINIC | Age: 27
End: 2022-09-29
Payer: MEDICAID

## 2022-10-05 ENCOUNTER — LAB VISIT (OUTPATIENT)
Dept: LAB | Facility: HOSPITAL | Age: 27
End: 2022-10-05
Attending: ADVANCED PRACTICE MIDWIFE
Payer: MEDICAID

## 2022-10-05 DIAGNOSIS — O20.0 THREATENED MISCARRIAGE IN EARLY PREGNANCY: ICD-10-CM

## 2022-10-05 LAB — HCG INTACT+B SERPL-ACNC: 8.7 MIU/ML

## 2022-10-05 PROCEDURE — 84702 CHORIONIC GONADOTROPIN TEST: CPT | Performed by: ADVANCED PRACTICE MIDWIFE

## 2022-10-05 PROCEDURE — 36415 COLL VENOUS BLD VENIPUNCTURE: CPT | Performed by: ADVANCED PRACTICE MIDWIFE

## 2022-10-07 DIAGNOSIS — O20.0 THREATENED MISCARRIAGE IN EARLY PREGNANCY: Primary | ICD-10-CM

## 2022-10-11 ENCOUNTER — TELEPHONE (OUTPATIENT)
Dept: OBSTETRICS AND GYNECOLOGY | Facility: CLINIC | Age: 27
End: 2022-10-11
Payer: MEDICAID

## 2022-10-11 ENCOUNTER — PATIENT MESSAGE (OUTPATIENT)
Dept: OBSTETRICS AND GYNECOLOGY | Facility: CLINIC | Age: 27
End: 2022-10-11
Payer: MEDICAID

## 2022-10-11 NOTE — TELEPHONE ENCOUNTER
Attempted to contact patient to schedule her next lab visit, no answer. Left patient voice mail to return call to clinic.

## 2022-11-07 ENCOUNTER — TELEPHONE (OUTPATIENT)
Dept: OBSTETRICS AND GYNECOLOGY | Facility: CLINIC | Age: 27
End: 2022-11-07
Payer: MEDICAID

## 2022-11-07 NOTE — TELEPHONE ENCOUNTER
----- Message from Yuli Colon sent at 11/7/2022 11:38 AM CST -----  Regarding: Sooner Appt  Contact: Patient  Patient is requesting a sooner appt for boil - patient is EP however not appts available. Please call patient back at [phone]. Thanks

## 2022-11-08 ENCOUNTER — LAB VISIT (OUTPATIENT)
Dept: LAB | Facility: HOSPITAL | Age: 27
End: 2022-11-08
Attending: NURSE PRACTITIONER
Payer: MEDICAID

## 2022-11-08 ENCOUNTER — OFFICE VISIT (OUTPATIENT)
Dept: OBSTETRICS AND GYNECOLOGY | Facility: CLINIC | Age: 27
End: 2022-11-08
Payer: MEDICAID

## 2022-11-08 VITALS — HEIGHT: 64 IN | WEIGHT: 250.25 LBS | BODY MASS INDEX: 42.72 KG/M2

## 2022-11-08 DIAGNOSIS — O20.0 THREATENED MISCARRIAGE IN EARLY PREGNANCY: ICD-10-CM

## 2022-11-08 DIAGNOSIS — L02.92 RECURRENT BOILS: ICD-10-CM

## 2022-11-08 DIAGNOSIS — O20.0 THREATENED MISCARRIAGE IN EARLY PREGNANCY: Primary | ICD-10-CM

## 2022-11-08 DIAGNOSIS — N89.8 VAGINAL ITCHING: ICD-10-CM

## 2022-11-08 LAB
ESTIMATED AVG GLUCOSE: 114 MG/DL (ref 68–131)
HBA1C MFR BLD: 5.6 % (ref 4–5.6)
HCG INTACT+B SERPL-ACNC: <2.4 MIU/ML

## 2022-11-08 PROCEDURE — 3044F PR MOST RECENT HEMOGLOBIN A1C LEVEL <7.0%: ICD-10-PCS | Mod: CPTII,,, | Performed by: NURSE PRACTITIONER

## 2022-11-08 PROCEDURE — 1160F RVW MEDS BY RX/DR IN RCRD: CPT | Mod: CPTII,,, | Performed by: NURSE PRACTITIONER

## 2022-11-08 PROCEDURE — 83036 HEMOGLOBIN GLYCOSYLATED A1C: CPT | Performed by: NURSE PRACTITIONER

## 2022-11-08 PROCEDURE — 99999 PR PBB SHADOW E&M-EST. PATIENT-LVL III: ICD-10-PCS | Mod: PBBFAC,,, | Performed by: NURSE PRACTITIONER

## 2022-11-08 PROCEDURE — 3008F BODY MASS INDEX DOCD: CPT | Mod: CPTII,,, | Performed by: NURSE PRACTITIONER

## 2022-11-08 PROCEDURE — 99213 OFFICE O/P EST LOW 20 MIN: CPT | Mod: S$PBB,TH,, | Performed by: NURSE PRACTITIONER

## 2022-11-08 PROCEDURE — 3044F HG A1C LEVEL LT 7.0%: CPT | Mod: CPTII,,, | Performed by: NURSE PRACTITIONER

## 2022-11-08 PROCEDURE — 1159F MED LIST DOCD IN RCRD: CPT | Mod: CPTII,,, | Performed by: NURSE PRACTITIONER

## 2022-11-08 PROCEDURE — 1159F PR MEDICATION LIST DOCUMENTED IN MEDICAL RECORD: ICD-10-PCS | Mod: CPTII,,, | Performed by: NURSE PRACTITIONER

## 2022-11-08 PROCEDURE — 36415 COLL VENOUS BLD VENIPUNCTURE: CPT | Performed by: NURSE PRACTITIONER

## 2022-11-08 PROCEDURE — 99999 PR PBB SHADOW E&M-EST. PATIENT-LVL III: CPT | Mod: PBBFAC,,, | Performed by: NURSE PRACTITIONER

## 2022-11-08 PROCEDURE — 1160F PR REVIEW ALL MEDS BY PRESCRIBER/CLIN PHARMACIST DOCUMENTED: ICD-10-PCS | Mod: CPTII,,, | Performed by: NURSE PRACTITIONER

## 2022-11-08 PROCEDURE — 99213 OFFICE O/P EST LOW 20 MIN: CPT | Mod: PBBFAC,TH | Performed by: NURSE PRACTITIONER

## 2022-11-08 PROCEDURE — 84702 CHORIONIC GONADOTROPIN TEST: CPT | Performed by: NURSE PRACTITIONER

## 2022-11-08 PROCEDURE — 99213 PR OFFICE/OUTPT VISIT, EST, LEVL III, 20-29 MIN: ICD-10-PCS | Mod: S$PBB,TH,, | Performed by: NURSE PRACTITIONER

## 2022-11-08 PROCEDURE — 3008F PR BODY MASS INDEX (BMI) DOCUMENTED: ICD-10-PCS | Mod: CPTII,,, | Performed by: NURSE PRACTITIONER

## 2022-11-08 RX ORDER — METHOCARBAMOL 750 MG/1
750 TABLET, FILM COATED ORAL 3 TIMES DAILY
COMMUNITY
Start: 2022-11-01

## 2022-11-08 RX ORDER — DOXYCYCLINE 100 MG/1
100 CAPSULE ORAL 2 TIMES DAILY
Qty: 14 CAPSULE | Refills: 0 | Status: SHIPPED | OUTPATIENT
Start: 2022-11-08 | End: 2022-11-15

## 2022-11-08 RX ORDER — HYDROXYZINE PAMOATE 100 MG/1
100 CAPSULE ORAL NIGHTLY PRN
COMMUNITY
Start: 2022-11-03

## 2022-11-08 RX ORDER — FLUCONAZOLE 150 MG/1
TABLET ORAL
Qty: 3 TABLET | Refills: 0 | Status: SHIPPED | OUTPATIENT
Start: 2022-11-08

## 2022-11-08 NOTE — PROGRESS NOTES
Subjective:       Patient ID: Zak Wheeler is a 27 y.o. female.    Chief Complaint:  Recurrent Skin Infections (Boil in panty line goes away and comes back )    Patient's last menstrual period was 2022.  History of Present Illness  Boil in pubic area. These are reoccurring. Patient will take oral antibiotic and topicals and they will go away for about 3 weeks and then another boil will appear. They are both painful and reoccuring.     OB History    Para Term  AB Living   1 0 0 0 0 0   SAB IAB Ectopic Multiple Live Births   0 0 0 0 0      # Outcome Date GA Lbr Anselmo/2nd Weight Sex Delivery Anes PTL Lv   1                 Review of Systems  Review of Systems        Objective:    Physical Exam  Abdominal:          Comments: Hard and swollen         Assessment:     1. Threatened miscarriage in early pregnancy    2. Recurrent boils    3. Vaginal itching              Plan:   Zak was seen today for recurrent skin infections.    Diagnoses and all orders for this visit:    Threatened miscarriage in early pregnancy  -     HCG, Quantitative; Future    Recurrent boils  -     doxycycline (MONODOX) 100 MG capsule; Take 1 capsule (100 mg total) by mouth 2 (two) times daily. for 7 days  -     Hemoglobin A1C; Future    Vaginal itching  -     fluconazole (DIFLUCAN) 150 MG Tab; Take one tablet today and may repeat every three days up to 3 doses    Recommend see PCP for recurrent boils evaluation and treat   Prone to yeast infections with antibiotic use   Return to clinic if symptoms do not improve Apply warm compresses

## 2023-06-13 ENCOUNTER — PATIENT MESSAGE (OUTPATIENT)
Dept: RESEARCH | Facility: HOSPITAL | Age: 28
End: 2023-06-13
Payer: MEDICAID

## 2023-06-20 ENCOUNTER — PATIENT MESSAGE (OUTPATIENT)
Dept: RESEARCH | Facility: HOSPITAL | Age: 28
End: 2023-06-20
Payer: MEDICAID

## 2023-07-07 NOTE — PROGRESS NOTES
"Subjective:      Zak Wheeler is a 27 y.o. female who presents for evaluation of positive pregnancy test with bleeding. She believes she could be pregnant. Pregnancy is desired. Sexual Activity: single partner, contraception: none. Current symptoms also include: positive home pregnancy test and bleeding . Last period was normal.    Had visit to ER 8/28 at [facility] with abdominal pain and found out she was pregnant. She started bleeding later that night stopped bleeding 3-4 days ago now just having pink discharge. Denies any abominal pain now. Denies fever, chills.      No LMP recorded (lmp unknown).  The following portions of the patient's history were reviewed and updated as appropriate: allergies, current medications, past family history, past medical history, past social history, past surgical history, and problem list.    Review of Systems  A comprehensive review of systems was negative except for: Genitourinary: positive for vaginal bleeding        Objective:      /84   Ht 5' 4" (1.626 m)   Wt 106.2 kg (234 lb 2.1 oz)   LMP  (LMP Unknown)   BMI 40.19 kg/m²   General: alert and no acute distress      Lab Review  Urine HCG: positive      Assessment:     Threatened miscarriage       Plan:      Pregnancy Test:  positive  Discussed risk of miscarriage with bleeding  Plan to do quantitative hCG and repeat in 2 days  CBC and T&S   " Detail Level: Zone

## 2024-05-28 NOTE — PROGRESS NOTES
Recovering well   Encourage Ambulation  Encourage breastfeeding  GBS pos  Rh pos     See my report in [facility].